# Patient Record
Sex: FEMALE | Race: WHITE | HISPANIC OR LATINO | Employment: FULL TIME | URBAN - METROPOLITAN AREA
[De-identification: names, ages, dates, MRNs, and addresses within clinical notes are randomized per-mention and may not be internally consistent; named-entity substitution may affect disease eponyms.]

---

## 2017-05-16 ENCOUNTER — HOSPITAL ENCOUNTER (OUTPATIENT)
Facility: MEDICAL CENTER | Age: 32
End: 2017-05-16
Attending: SPECIALIST | Admitting: SPECIALIST
Payer: MEDICAID

## 2017-05-16 VITALS
SYSTOLIC BLOOD PRESSURE: 124 MMHG | TEMPERATURE: 97.9 F | HEART RATE: 93 BPM | DIASTOLIC BLOOD PRESSURE: 69 MMHG | RESPIRATION RATE: 18 BRPM

## 2017-05-16 LAB — CRYSTALS AMN MICRO: NORMAL

## 2017-05-16 PROCEDURE — 89060 EXAM SYNOVIAL FLUID CRYSTALS: CPT

## 2017-05-16 PROCEDURE — 59025 FETAL NON-STRESS TEST: CPT | Performed by: SPECIALIST

## 2017-05-17 NOTE — PROGRESS NOTES
EDC -12-17 36.1 weeks pt is here with C/O leakage of fluid and contractions. External monitors on speculum exam done no pooling of fluid noted, fern test done and sent to lab. SVE done 4 cm thick and high.   fern results is negative.   Dr Neil was called report given, SVE repeated no change. Order received to discharge pt home.    instructions was given  and pt was discharged with FOB.

## 2017-06-04 NOTE — H&P
DATE OF SCHEDULED ADMISSION:  2017    REASON FOR ADMISSION:  Prodromal labor with a favorable Mckeon score and   cervix.    HISTORY OF PRESENT ILLNESS:  This is a 31-year-old  4, para 3 at   39-1/7th weeks gestation based on excellent dates with a 7-week ultrasound who   has been having prodromal labor symptoms, has been 3-4 cm for nearly 4 weeks   and is having increasing pelvic discomfort and pressure.  Given her favorable   Mckeon score and excellent dating, she is now interested in proceeding forward   with augmentation of labor with Pitocin per protocol.  Risks, benefits and   alternatives have been addressed.  She has asked appropriate questions and   wished to proceed forward with augmentation as planned.    PAST MEDICAL HISTORY:  Migraine headaches.    PAST SURGICAL HISTORY:  Herniorrhaphy in  and a tummy tuck in .    OBSTETRICAL HISTORY:  In ,  and , patient had term vaginal   deliveries.  This is her fourth pregnancy.    SOCIAL HISTORY:  She denies the use of any alcohol, tobacco, or recreational   drug use.    MEDICATIONS:  Prenatal vitamins.    ALLERGIES:  No known drug allergies.    PHYSICAL EXAMINATION:  VITAL SIGNS:  She is afebrile, hemodynamically stable.  HEART:  Regular rate and rhythm.  CHEST:  Clear to auscultation bilaterally.  ABDOMEN:  Soft, gravid, and nontender.  PELVIC:  Sterile vaginal exam, 3-4, 80%, -2 station.  EXTREMITIES:  Nontender.    LABORATORY DATA:  Prenatal care labs are all in order.  She is group B strep   negative.    ASSESSMENT AND PLAN:  A 31-year-old  4, para 3, at term with overall   reassuring fetal status, who now elects to proceed forward with augmentation   of labor with Pitocin per protocol.  The risks, benefits and alternatives have   been addressed.  She asked appropriate questions and wished to proceed   forward with Pitocin augmentation.       ____________________________________     KAYLYN TILLMAN MD    [facility] /  [facility]    DD:  06/02/2017 13:05:36  DT:  06/02/2017 16:22:02    D#:  [id number]  Job#:  [id number]

## 2017-06-06 ENCOUNTER — HOSPITAL ENCOUNTER (INPATIENT)
Facility: MEDICAL CENTER | Age: 32
LOS: 1 days | End: 2017-06-07
Attending: SPECIALIST | Admitting: SPECIALIST
Payer: MEDICAID

## 2017-06-06 LAB
BASOPHILS # BLD AUTO: 0.3 % (ref 0–1.8)
BASOPHILS # BLD: 0.02 K/UL (ref 0–0.12)
EOSINOPHIL # BLD AUTO: 0.13 K/UL (ref 0–0.51)
EOSINOPHIL NFR BLD: 1.7 % (ref 0–6.9)
ERYTHROCYTE [DISTWIDTH] IN BLOOD BY AUTOMATED COUNT: 47.8 FL (ref 35.9–50)
ERYTHROCYTE [DISTWIDTH] IN BLOOD BY AUTOMATED COUNT: 48.5 FL (ref 35.9–50)
HCT VFR BLD AUTO: 29 % (ref 37–47)
HCT VFR BLD AUTO: 30.8 % (ref 37–47)
HGB BLD-MCNC: 9.2 G/DL (ref 12–16)
HGB BLD-MCNC: 9.6 G/DL (ref 12–16)
HOLDING TUBE BB 8507: NORMAL
IMM GRANULOCYTES # BLD AUTO: 0.08 K/UL (ref 0–0.11)
IMM GRANULOCYTES NFR BLD AUTO: 1.1 % (ref 0–0.9)
LYMPHOCYTES # BLD AUTO: 2.3 K/UL (ref 1–4.8)
LYMPHOCYTES NFR BLD: 30.5 % (ref 22–41)
MCH RBC QN AUTO: 26.7 PG (ref 27–33)
MCH RBC QN AUTO: 27.1 PG (ref 27–33)
MCHC RBC AUTO-ENTMCNC: 31.2 G/DL (ref 33.6–35)
MCHC RBC AUTO-ENTMCNC: 31.7 G/DL (ref 33.6–35)
MCV RBC AUTO: 85.3 FL (ref 81.4–97.8)
MCV RBC AUTO: 85.6 FL (ref 81.4–97.8)
MONOCYTES # BLD AUTO: 0.54 K/UL (ref 0–0.85)
MONOCYTES NFR BLD AUTO: 7.2 % (ref 0–13.4)
NEUTROPHILS # BLD AUTO: 4.48 K/UL (ref 2–7.15)
NEUTROPHILS NFR BLD: 59.2 % (ref 44–72)
NRBC # BLD AUTO: 0 K/UL
NRBC BLD AUTO-RTO: 0 /100 WBC
PLATELET # BLD AUTO: 147 K/UL (ref 164–446)
PLATELET # BLD AUTO: 169 K/UL (ref 164–446)
PMV BLD AUTO: 12.6 FL (ref 9–12.9)
PMV BLD AUTO: 12.6 FL (ref 9–12.9)
RBC # BLD AUTO: 3.4 M/UL (ref 4.2–5.4)
RBC # BLD AUTO: 3.6 M/UL (ref 4.2–5.4)
WBC # BLD AUTO: 13.5 K/UL (ref 4.8–10.8)
WBC # BLD AUTO: 7.6 K/UL (ref 4.8–10.8)

## 2017-06-06 PROCEDURE — 303615 HCHG EPIDURAL/SPINAL ANESTHESIA FOR LABOR

## 2017-06-06 PROCEDURE — 700102 HCHG RX REV CODE 250 W/ 637 OVERRIDE(OP): Performed by: SPECIALIST

## 2017-06-06 PROCEDURE — 4A1HX4Z MONITORING OF PRODUCTS OF CONCEPTION, CARDIAC ELECTRICAL ACTIVITY, EXTERNAL APPROACH: ICD-10-PCS | Performed by: SPECIALIST

## 2017-06-06 PROCEDURE — 10907ZC DRAINAGE OF AMNIOTIC FLUID, THERAPEUTIC FROM PRODUCTS OF CONCEPTION, VIA NATURAL OR ARTIFICIAL OPENING: ICD-10-PCS | Performed by: SPECIALIST

## 2017-06-06 PROCEDURE — 700101 HCHG RX REV CODE 250

## 2017-06-06 PROCEDURE — 304965 HCHG RECOVERY SERVICES

## 2017-06-06 PROCEDURE — 0UQMXZZ REPAIR VULVA, EXTERNAL APPROACH: ICD-10-PCS | Performed by: SPECIALIST

## 2017-06-06 PROCEDURE — 85027 COMPLETE CBC AUTOMATED: CPT

## 2017-06-06 PROCEDURE — 36415 COLL VENOUS BLD VENIPUNCTURE: CPT

## 2017-06-06 PROCEDURE — A9270 NON-COVERED ITEM OR SERVICE: HCPCS | Performed by: SPECIALIST

## 2017-06-06 PROCEDURE — 770002 HCHG ROOM/CARE - OB PRIVATE (112)

## 2017-06-06 PROCEDURE — 700111 HCHG RX REV CODE 636 W/ 250 OVERRIDE (IP)

## 2017-06-06 PROCEDURE — 700105 HCHG RX REV CODE 258

## 2017-06-06 PROCEDURE — 700111 HCHG RX REV CODE 636 W/ 250 OVERRIDE (IP): Performed by: SPECIALIST

## 2017-06-06 PROCEDURE — 85025 COMPLETE CBC W/AUTO DIFF WBC: CPT

## 2017-06-06 PROCEDURE — 59409 OBSTETRICAL CARE: CPT

## 2017-06-06 PROCEDURE — 700105 HCHG RX REV CODE 258: Performed by: SPECIALIST

## 2017-06-06 RX ORDER — VITAMIN A ACETATE, BETA CAROTENE, ASCORBIC ACID, CHOLECALCIFEROL, .ALPHA.-TOCOPHEROL ACETATE, DL-, THIAMINE MONONITRATE, RIBOFLAVIN, NIACINAMIDE, PYRIDOXINE HYDROCHLORIDE, FOLIC ACID, CYANOCOBALAMIN, CALCIUM CARBONATE, FERROUS FUMARATE, ZINC OXIDE, CUPRIC OXIDE 3080; 12; 120; 400; 1; 1.84; 3; 20; 22; 920; 25; 200; 27; 10; 2 [IU]/1; UG/1; MG/1; [IU]/1; MG/1; MG/1; MG/1; MG/1; MG/1; [IU]/1; MG/1; MG/1; MG/1; MG/1; MG/1
1 TABLET, FILM COATED ORAL EVERY MORNING
Status: DISCONTINUED | OUTPATIENT
Start: 2017-06-06 | End: 2017-06-07 | Stop reason: HOSPADM

## 2017-06-06 RX ORDER — TERBUTALINE SULFATE 1 MG/ML
0.25 INJECTION, SOLUTION SUBCUTANEOUS PRN
Status: DISCONTINUED | OUTPATIENT
Start: 2017-06-06 | End: 2017-06-06 | Stop reason: HOSPADM

## 2017-06-06 RX ORDER — DOCUSATE SODIUM 100 MG/1
100 CAPSULE, LIQUID FILLED ORAL 2 TIMES DAILY PRN
Status: DISCONTINUED | OUTPATIENT
Start: 2017-06-06 | End: 2017-06-07 | Stop reason: HOSPADM

## 2017-06-06 RX ORDER — ONDANSETRON 2 MG/ML
4 INJECTION INTRAMUSCULAR; INTRAVENOUS EVERY 6 HOURS PRN
Status: DISCONTINUED | OUTPATIENT
Start: 2017-06-06 | End: 2017-06-07 | Stop reason: HOSPADM

## 2017-06-06 RX ORDER — BISACODYL 10 MG
10 SUPPOSITORY, RECTAL RECTAL PRN
Status: DISCONTINUED | OUTPATIENT
Start: 2017-06-06 | End: 2017-06-07 | Stop reason: HOSPADM

## 2017-06-06 RX ORDER — OXYCODONE HYDROCHLORIDE AND ACETAMINOPHEN 5; 325 MG/1; MG/1
1 TABLET ORAL EVERY 4 HOURS PRN
Status: DISCONTINUED | OUTPATIENT
Start: 2017-06-06 | End: 2017-06-07 | Stop reason: HOSPADM

## 2017-06-06 RX ORDER — BUPIVACAINE HYDROCHLORIDE 2.5 MG/ML
INJECTION, SOLUTION EPIDURAL; INFILTRATION; INTRACAUDAL
Status: DISCONTINUED
Start: 2017-06-06 | End: 2017-06-06

## 2017-06-06 RX ORDER — SODIUM CHLORIDE, SODIUM LACTATE, POTASSIUM CHLORIDE, CALCIUM CHLORIDE 600; 310; 30; 20 MG/100ML; MG/100ML; MG/100ML; MG/100ML
INJECTION, SOLUTION INTRAVENOUS
Status: COMPLETED
Start: 2017-06-06 | End: 2017-06-06

## 2017-06-06 RX ORDER — IBUPROFEN 600 MG/1
600 TABLET ORAL EVERY 6 HOURS PRN
Status: DISCONTINUED | OUTPATIENT
Start: 2017-06-06 | End: 2017-06-07 | Stop reason: HOSPADM

## 2017-06-06 RX ORDER — ROPIVACAINE HYDROCHLORIDE 2 MG/ML
INJECTION, SOLUTION EPIDURAL; INFILTRATION; PERINEURAL
Status: COMPLETED
Start: 2017-06-06 | End: 2017-06-06

## 2017-06-06 RX ORDER — OXYCODONE HYDROCHLORIDE AND ACETAMINOPHEN 5; 325 MG/1; MG/1
2 TABLET ORAL EVERY 4 HOURS PRN
Status: DISCONTINUED | OUTPATIENT
Start: 2017-06-06 | End: 2017-06-07 | Stop reason: HOSPADM

## 2017-06-06 RX ORDER — SODIUM CHLORIDE, SODIUM LACTATE, POTASSIUM CHLORIDE, CALCIUM CHLORIDE 600; 310; 30; 20 MG/100ML; MG/100ML; MG/100ML; MG/100ML
INJECTION, SOLUTION INTRAVENOUS CONTINUOUS
Status: DISCONTINUED | OUTPATIENT
Start: 2017-06-06 | End: 2017-06-06 | Stop reason: HOSPADM

## 2017-06-06 RX ORDER — SODIUM CHLORIDE, SODIUM LACTATE, POTASSIUM CHLORIDE, CALCIUM CHLORIDE 600; 310; 30; 20 MG/100ML; MG/100ML; MG/100ML; MG/100ML
INJECTION, SOLUTION INTRAVENOUS PRN
Status: DISCONTINUED | OUTPATIENT
Start: 2017-06-06 | End: 2017-06-07 | Stop reason: HOSPADM

## 2017-06-06 RX ORDER — HYDROXYZINE 50 MG/1
50 TABLET, FILM COATED ORAL EVERY 6 HOURS PRN
Status: DISCONTINUED | OUTPATIENT
Start: 2017-06-06 | End: 2017-06-06 | Stop reason: HOSPADM

## 2017-06-06 RX ORDER — ONDANSETRON 4 MG/1
4 TABLET, ORALLY DISINTEGRATING ORAL EVERY 6 HOURS PRN
Status: DISCONTINUED | OUTPATIENT
Start: 2017-06-06 | End: 2017-06-07 | Stop reason: HOSPADM

## 2017-06-06 RX ORDER — MISOPROSTOL 200 UG/1
800 TABLET ORAL
Status: COMPLETED | OUTPATIENT
Start: 2017-06-06 | End: 2017-06-06

## 2017-06-06 RX ORDER — MISOPROSTOL 200 UG/1
600 TABLET ORAL
Status: DISCONTINUED | OUTPATIENT
Start: 2017-06-06 | End: 2017-06-07 | Stop reason: HOSPADM

## 2017-06-06 RX ADMIN — IBUPROFEN 600 MG: 600 TABLET, FILM COATED ORAL at 13:25

## 2017-06-06 RX ADMIN — ROPIVACAINE HYDROCHLORIDE 200 MG: 2 INJECTION, SOLUTION EPIDURAL; INFILTRATION at 07:34

## 2017-06-06 RX ADMIN — SODIUM CHLORIDE, POTASSIUM CHLORIDE, SODIUM LACTATE AND CALCIUM CHLORIDE: 600; 310; 30; 20 INJECTION, SOLUTION INTRAVENOUS at 05:48

## 2017-06-06 RX ADMIN — MISOPROSTOL 800 MCG: 200 TABLET ORAL at 10:10

## 2017-06-06 RX ADMIN — OXYCODONE HYDROCHLORIDE AND ACETAMINOPHEN 1 TABLET: 5; 325 TABLET ORAL at 17:01

## 2017-06-06 RX ADMIN — OXYTOCIN 1 MILLI-UNITS/MIN: 10 INJECTION, SOLUTION INTRAMUSCULAR; INTRAVENOUS at 05:45

## 2017-06-06 RX ADMIN — Medication 125 ML/HR: at 11:04

## 2017-06-06 RX ADMIN — IBUPROFEN 600 MG: 600 TABLET, FILM COATED ORAL at 20:14

## 2017-06-06 RX ADMIN — SODIUM CHLORIDE, POTASSIUM CHLORIDE, SODIUM LACTATE AND CALCIUM CHLORIDE 1000 ML: 600; 310; 30; 20 INJECTION, SOLUTION INTRAVENOUS at 07:30

## 2017-06-06 ASSESSMENT — PATIENT HEALTH QUESTIONNAIRE - PHQ9
SUM OF ALL RESPONSES TO PHQ QUESTIONS 1-9: 0
1. LITTLE INTEREST OR PLEASURE IN DOING THINGS: NOT AT ALL
SUM OF ALL RESPONSES TO PHQ9 QUESTIONS 1 AND 2: 0
2. FEELING DOWN, DEPRESSED, IRRITABLE, OR HOPELESS: NOT AT ALL

## 2017-06-06 ASSESSMENT — PAIN SCALES - GENERAL
PAINLEVEL_OUTOF10: 1
PAINLEVEL_OUTOF10: 5
PAINLEVEL_OUTOF10: 1
PAINLEVEL_OUTOF10: 3
PAINLEVEL_OUTOF10: 1

## 2017-06-06 ASSESSMENT — COPD QUESTIONNAIRES
COPD SCREENING SCORE: 0
HAVE YOU SMOKED AT LEAST 100 CIGARETTES IN YOUR ENTIRE LIFE: NO/DON'T KNOW
DO YOU EVER COUGH UP ANY MUCUS OR PHLEGM?: NO/ONLY WITH OCCASIONAL COLDS OR INFECTIONS
DURING THE PAST 4 WEEKS HOW MUCH DID YOU FEEL SHORT OF BREATH: NONE/LITTLE OF THE TIME

## 2017-06-06 ASSESSMENT — LIFESTYLE VARIABLES
DO YOU DRINK ALCOHOL: NO
ALCOHOL_USE: NO
EVER_SMOKED: NEVER

## 2017-06-06 NOTE — IP AVS SNAPSHOT
6/7/2017    [address]  [facility] [address]    Dear Ainsley:    [facility] wants to ensure your discharge home is safe and you or your loved ones have had all of your questions answered regarding your care after you leave the hospital.    Below is a list of resources and contact information should you have any questions regarding your hospital stay, follow-up instructions, or active medical symptoms.    Questions or Concerns Regarding… Contact   Medical Questions Related to Your Discharge  (7 days a week, 8am-5pm) Contact a Nurse Care Coordinator   [phone]   Medical Questions Not Related to Your Discharge  (24 hours a day / 7 days a week)  Contact the Nurse Health Line   [phone]    Medications or Discharge Instructions Refer to your discharge packet   or contact your [facility] Primary Care Provider   [phone]   Follow-up Appointment(s) Schedule your appointment via ImageProtect   or contact Scheduling [phone]   Billing Review your statement via ImageProtect  or contact Billing [phone]   Medical Records Review your records via ImageProtect   or contact Medical Records [phone]     You may receive a telephone call within two days of discharge. This call is to make certain you understand your discharge instructions and have the opportunity to have any questions answered. You can also easily access your medical information, test results and upcoming appointments via the ImageProtect free online health management tool. You can learn more and sign up at [email or web address]. For assistance setting up your ImageProtect account, please call [phone].    Once again, we want to ensure your discharge home is safe and that you have a clear understanding of any next steps in your care. If you have any questions or concerns, please do not hesitate to contact us, we are here for you. Thank you for choosing [facility] for your healthcare needs.    Sincerely,    Your [facility] Healthcare Team

## 2017-06-06 NOTE — IP AVS SNAPSHOT
Home Care Instructions                                                                                                                Ainsley Baker   MRN: [id number]    Department:  POST PARTUM [facility]   2017           Follow-up Information     1. Follow up with Julián Neil M.D.. Schedule an appointment as soon as possible for a visit in 6 weeks.    Specialty:  OB/Gyn    Why:  for follow up    Contact information    [address]  [place] [address]  [phone]         I assume responsibility for securing a follow-up [place] Screening blood test on my baby within the specified date range.    -                  Discharge Instructions       POSTPARTUM DISCHARGE INSTRUCTIONS FOR MOM    YOB: 1985   Age: 31 y.o.               Admit Date: 2017     Discharge Date: 2017  Attending Doctor:  Julián Neil M.D.                  Allergies:  Review of patient's allergies indicates no known allergies.    Discharged to home by car. Discharged via wheelchair, hospital escort: Yes.  Special equipment needed: Not Applicable  Belongings with: Personal  Be sure to schedule a follow-up appointment with your primary care doctor or any specialists as instructed.     Discharge Plan:   Diet Plan: Discussed  Activity Level: Discussed  Confirmed Follow up Appointment: Patient to Call and Schedule Appointment  Confirmed Symptoms Management: Discussed  Medication Reconciliation Updated: Yes  Influenza Vaccine Indication: Indicated: Not available from distributor/    REASONS TO CALL YOUR OBSTETRICIAN:  1.   Persistent fever or shaking chills (Temperature higher than 100.4)  2.   Heavy bleeding (soaking more than 1 pad per hour); Passing clots  3.   Foul odor from vagina  4.   Mastitis (Breast infection; breast pain, chills, fever, redness)  5.   Urinary pain, burning or frequency  6.   Episiotomy infection  7.   Abdominal incision infection  8.   Severe depression longer than 24 hours    HAND  "WASHING  · Prior to handling the baby.  · Before breastfeeding or bottle feeding baby.  · After using the bathroom or changing the baby's diaper.    WOUND CARE  Ask your physician for additional care instructions.  In general:    ·  Incision:      · Keep clean and dry.    · Do NOT lift anything heavier than your baby for up to 6 weeks.    · There should not be any opening or pus.      VAGINAL CARE  · Nothing inside vagina for 6 weeks: no sexual intercourse, tampons or douching.  · Bleeding may continue for 2-4 weeks.  Amount may vary.    · Call your physician for heavy bleeding which means soaking more than 1 pad per hour    BIRTH CONTROL  · It is possible to become pregnant at any time after delivery and while breastfeeding.  · Plan to discuss a method of birth control with your physician at your follow up visit. visit.    DIET AND ELIMINATION  · Eating more fiber (bran cereal, fruits, and vegetables) and drinking plenty of fluids will help to avoid constipation.  · Urinary frequency after childbirth is normal.    POSTPARTUM BLUES  During the first few days after birth, you may experience a sense of the \"blues\" which may include impatience, irritability or even crying.  These feeling come and go quickly.  However, as many as 1 in 10 women experience emotional symptoms known as postpartum depression.    Postpartum depression:  May start as early as the second or third day after delivery or take several weeks or months to develop.  Symptoms of \"blues\" are present, but are more intense:  Crying spells; loss of appetite; feelings of hopelessness or loss of control; fear of touching the baby; over concern or no concern at all about the baby; little or no concern about your own appearance/caring for yourself; and/or inability to sleep or excessive sleeping.  Contact your physician if you are experiencing any of these symptoms.    Crisis Hotline:  · National Crisis Hotline:  9-380-XJJFQTZ  Or " "1-177.330.2378  · [place] Crisis Hotline:  1-213.196.2177  Or 718-998-5891    PREVENTING SHAKEN BABY:  If you are angry or stressed, PUT THE BABY IN THE CRIB, step away, take some deep breaths, and wait until you are calm to care for the baby.  DO NOT SHAKE THE BABY.  You are not alone, call a supporter for help.    · Crisis Call Center 24/7 crisis line 660-080-0029 or 1-308.822.9447  · You can also text them, text \"ANSWER\" to 671475    QUIT SMOKING/TOBACCO USE:  I understand the use of any tobacco products increases my chance of suffering from future heart disease and could cause other illnesses which may shorten my life. Quitting the use of tobacco products is the single most important thing I can do to improve my health. For further information on smoking / tobacco cessation call a Toll Free Quit Line at 1-418.151.1609 (*National [employer] [place]) or 1-536.930.5908 (American Lung Association) or you can access the web based program at www.lungusa.org.    · [place] Tobacco Users Help Line:  (376) 585-1691       Toll Free: 1-509.666.8622  · Quit Tobacco Program [facility] (432)638-2453    DEPRESSION / SUICIDE RISK:  As you are discharged from this [facility] facility, it is important to learn how to keep safe from harming yourself.    Recognize the warning signs:  · Abrupt changes in personality, positive or negative- including increase in energy   · Giving away possessions  · Change in eating patterns- significant weight changes-  positive or negative  · Change in sleeping patterns- unable to sleep or sleeping all the time   · Unwillingness or inability to communicate  · Depression  · Unusual sadness, discouragement and loneliness  · Talk of wanting to die  · Neglect of personal appearance   · Rebelliousness- reckless behavior  · Withdrawal from people/activities they love  · Confusion- inability to concentrate     If you or a loved one observes any of these behaviors or has concerns about " self-harm, here's what you can do:  · Talk about it- your feelings and reasons for harming yourself  · Remove any means that you might use to hurt yourself (examples: pills, rope, extension cords, firearm)  · Get professional help from the community (Mental Health, Substance Abuse, psychological counseling)  · Do not be alone:Call your Safe Contact- someone whom you trust who will be there for you.  · Call your local CRISIS HOTLINE 420-0154 or 584-318-1075  · Call your local Children's Mobile Crisis Response Team [place] (725) 200-8543 or www.[employer]  · Call the toll free National Suicide Prevention Hotlines   · National Suicide Prevention Lifeline 490-135-PKQF (6748)  · National Hope Line Network 800-SUICIDE (229-8870)    DISCHARGE SURVEY:  Thank you for choosing [facility].  We hope we provided you with very good care.  You may be receiving a survey in the mail.  Please fill it out.  Your opinion is valuable to us.    ADDITIONAL EDUCATIONAL MATERIALS GIVEN TO PATIENT:  Pelvic rest for 6 weeks.  Follow up with me in the office in 6 weeks for a post partum visit.      My signature on this form indicates that:  1.  I have reviewed and understand the above information  2.  My questions regarding this information have been answered to my satisfaction.  3.  I have formulated a plan with my discharge nurse to obtain my prescribed medication for home.         Discharge Medication Instructions:    Below are the medications your physician expects you to take upon discharge:    Review all your home medications and newly ordered medications with your doctor and/or pharmacist. Follow medication instructions as directed by your doctor and/or pharmacist.    Please keep your medication list with you and share with your physician.               Medication List      START taking these medications        Instructions    Morning Afternoon Evening Bedtime     MG Caps        Take 100 mg by mouth 2 times a day  as needed for Constipation.   Dose:  100 mg                        ferrous sulfate 325 (65 FE) MG tablet        Take 1 Tab by mouth every day.   Dose:  325 mg                        ibuprofen 600 MG Tabs   Last time this was given:  600 mg on 6/7/2017  8:33 AM   Commonly known as:  MOTRIN        Take 1 Tab by mouth every 6 hours as needed (For cramping after delivery; do not give if patient is receiving ketorolac (Toradol)).   Dose:  600 mg                        oxycodone-acetaminophen 5-325 MG Tabs   Last time this was given:  1 Tab on 6/7/2017  6:30 AM   Commonly known as:  PERCOCET        Take 1-2 Tabs by mouth every four hours as needed (for Moderate Pain (Pain Scale 4-6) after delivery).   Dose:  1-2 Tab                          CONTINUE taking these medications        Instructions    Morning Afternoon Evening Bedtime    PNV PO        Take  by mouth.                             Where to Get Your Medications      You can get these medications from any pharmacy     Bring a paper prescription for each of these medications    - ferrous sulfate 325 (65 FE) MG tablet      Information about where to get these medications is not yet available     ! Ask your nurse or doctor about these medications    -  MG Caps  - ibuprofen 600 MG Tabs  - oxycodone-acetaminophen 5-325 MG Tabs            Crisis Hotline:     [place] Crisis Hotline:  6-940-AIUDVDV or 1-936.887.2098    [place] Crisis Hotline:    1-841.876.7098 or 646-060-1980        Disclaimer           _____________________________________                     __________       ________       Patient/Mother Signature or Legal                          Date                   Time

## 2017-06-06 NOTE — CARE PLAN
Problem: Altered physiologic condition related to immediate post-delivery state and potential for bleeding/hemorrhage  Goal: Patient physiologically stable as evidenced by normal lochia, palpable uterine involution and vital signs within normal limits  Outcome: PROGRESSING AS EXPECTED   Patient in stable condition, vitals WDL, lochia light, and fundus firm.     Problem: Potential for postpartum infection related to presence of episiotomy/vaginal tear and/or uterine contamination  Goal: Patient will be absent from signs and symptoms of infection  Outcome: PROGRESSING AS EXPECTED   Patient is free from any s/s of infection at this time. All vitals are WDL. Patient does not appear to be in any kind of distress at this time. Will continue to monitor.

## 2017-06-06 NOTE — OR SURGEON
Immediate Post-Operative Note          Estimated Blood Loss: 300ccs    Findings:  over bilateral labial abrasions without any nuchal cord with easy delivery of the shoulders and body with the placenta spontaneous and intact with 3vc with Apgars of 8/9 at one and five min respectively    Complications: none        2017 11:11 AM Julián Neil

## 2017-06-06 NOTE — H&P
ADDENDUM    Briefly, this is a 31-year-old  4, para 3 at 39-1/7 weeks' gestation   with prodromal labor symptoms who has been having increasing pelvic discomfort   over the last month, had been seen on the day prior to presentation and it   changed from 3-4 cm and on the day of presentation for her augmentation of   labor for prodromal labor symptoms and favorable cervix, she was found to have   changed her cervix to 5-6 cm with a bulging bag of water, vertex presentation   was confirmed, overall reassuring fetal status was noted and the patient was   admitted with the plan to proceed forward with admission given her active   labor.       ____________________________________     MD CRISPIN GRIJALVA / [facility]    DD:  2017 07:00:04  DT:  2017 07:08:27    D#:  [id number]  Job#:  [id number]

## 2017-06-06 NOTE — DELIVERY NOTE
DATE OF SERVICE:  2017    DELIVERY NOTE:  Briefly, this is a 31-year-old  4, para 3 at 39 and   1/7th weeks gestation who had been scheduled for augmentation of labor given   prodromal labor who presented in active labor at 5-6 cm dilated.  Overall,   reassuring fetal status was noted.  The patient progressed well in labor, had   an artificial rupture of membranes and clear amniotic fluid noted, had in a   continuous lumbar epidural to optimize pain management, subsequently underwent   a spontaneous vaginal delivery of her bilateral labial lacerations with easy   delivery of the shoulders and body.  Cord was clamped and cut.  Infant was   handed to waiting nursing staff.  Apgars were 8 and 9 at 1 and 5 minutes   respectively.  Placenta was spontaneous and intact with 3-vessel cord.  The   labial abrasions were reapproximated with single interrupted sutures using 4-0   Vicryl.  Patient tolerated labor and delivery and repair well.       ____________________________________     MD CRISPIN GRIJALVA / [place]    DD:  2017 11:13:29  DT:  2017 14:31:29    D#:  [id number]  Job#:  [id number]

## 2017-06-06 NOTE — IP AVS SNAPSHOT
[employer] Access Code: [email or web address]  Expires: 7/7/2017 10:17 AM    [employer]  A secure, online tool to manage your health information     [employer]’s InteliWISE USA® is a secure, online tool that connects you to your personalized health information from the privacy of your home -- day or night - making it very easy for you to manage your healthcare. Once the activation process is completed, you can even access your medical information using the [employer] USA wen, which is available for free in the Apple Wen store or Google Play store.     [employer] USA provides the following levels of access (as shown below):   My Chart Features   [facility] Primary Care Doctor [facility]  Specialists [facility]  Urgent  Care Non-[facility]  Primary Care  Doctor   Email your healthcare team securely and privately 24/7 X X X X   Manage appointments: schedule your next appointment; view details of past/upcoming appointments X      Request prescription refills. X      View recent personal medical records, including lab and immunizations X X X X   View health record, including health history, allergies, medications X X X X   Read reports about your outpatient visits, procedures, consult and ER notes X X X X   See your discharge summary, which is a recap of your hospital and/or ER visit that includes your diagnosis, lab results, and care plan. X X       How to register for [employer]WISE USA:  1. Go to  [email or web address].  2. Click on the Sign Up Now box, which takes you to the New Member Sign Up page. You will need to provide the following information:  a. Enter your [employer] USA Access Code exactly as it appears at the top of this page. (You will not need to use this code after you’ve completed the sign-up process. If you do not sign up before the expiration date, you must request a new code.)   b. Enter your date of birth.   c. Enter your home email address.   d. Click Submit, and follow the next screen’s instructions.  3. Create a [employer] USA ID. This will be your [employer]  login ID and cannot be changed, so think of one that is secure and easy to remember.  4. Create a [employer] password. You can change your password at any time.  5. Enter your Password Reset Question and Answer. This can be used at a later time if you forget your password.   6. Enter your e-mail address. This allows you to receive e-mail notifications when new information is available in [employer].  7. Click Sign Up. You can now view your health information.    For assistance activating your [employer] account, call [phone]

## 2017-06-06 NOTE — PROGRESS NOTES
Patient received from labor and delivery to S353  . Bedside report received from RN L&D , assumed care of patient.  Patient oriented to room and surroundings, call system, skylight education channel, visiting policy, infant security including ID bands, not letting anyone take infant without photo ID, 0-10 pain scale and pain management discussed. Patient denies any pain and any needs at this time.  Patient instructed to call for assistance as needed. Assessment done.

## 2017-06-06 NOTE — PROGRESS NOTES
0510 - 30 y/o  EDC , EGA 39.1, here to L&D room 221 with  Al. Pt here for scheduled elective IOL. EFM/TOCO applied, Patient states positive FM. Denies vaginal LOF or Bleeding. VSS.   0525 - IV started, labs collected, admit profile completed.   0545 - SVE - 5-6/50/-1. Pitocin started per orders. Discussed poc with pt. Pt desires epidural once in active labor.   0626 - Dr Neil at bedside. SVE - 5-6cm. AROM - small amount of bloody fluid noted.  0710 - Pt requesting epidural, consents signed, LR bolus started.   0726 - Dr Hewitt at bedside for epidural placement. VSS.   0731 - Test dose administered without complications.   0745 - Staples placed. SVE - 7/80/-1, moderate amount of bloody fluid noted.   0750 - Pt wedged to right side, denying any pain with UC's.   0900 - Dr Neil at bedside. SVE - 8cm. Pt repositioned with peanut ball in place.   0955 - Repetitive variable decels noted. SVE - complete. Dr Neil notified and in route to L&D.   1006 - Dr Neil to bedside for delivery of viable baby boy, apgar 8/9. Bilateral labial tears noted and repaired by MD.   1215 - Pt up to bathroom and voided. Pt transferred to  via .   1235 - Report given to Tory TAN. All questions answered.

## 2017-06-07 VITALS
OXYGEN SATURATION: 96 % | RESPIRATION RATE: 18 BRPM | BODY MASS INDEX: 32.02 KG/M2 | DIASTOLIC BLOOD PRESSURE: 66 MMHG | SYSTOLIC BLOOD PRESSURE: 102 MMHG | HEART RATE: 71 BPM | WEIGHT: 174 LBS | HEIGHT: 62 IN | TEMPERATURE: 97.4 F

## 2017-06-07 PROCEDURE — 700102 HCHG RX REV CODE 250 W/ 637 OVERRIDE(OP): Performed by: SPECIALIST

## 2017-06-07 PROCEDURE — A9270 NON-COVERED ITEM OR SERVICE: HCPCS | Performed by: SPECIALIST

## 2017-06-07 RX ORDER — FERROUS SULFATE 325(65) MG
325 TABLET ORAL DAILY
Qty: 30 TAB | Refills: 0 | Status: SHIPPED | OUTPATIENT
Start: 2017-06-07 | End: 2019-09-06

## 2017-06-07 RX ORDER — IBUPROFEN 600 MG/1
600 TABLET ORAL EVERY 6 HOURS PRN
Qty: 30 TAB | Refills: 0 | Status: SHIPPED | OUTPATIENT
Start: 2017-06-07 | End: 2023-08-18

## 2017-06-07 RX ORDER — PSEUDOEPHEDRINE HCL 30 MG
100 TABLET ORAL 2 TIMES DAILY PRN
Qty: 60 CAP | Refills: 0 | Status: SHIPPED | OUTPATIENT
Start: 2017-06-07 | End: 2019-09-06

## 2017-06-07 RX ORDER — OXYCODONE HYDROCHLORIDE AND ACETAMINOPHEN 5; 325 MG/1; MG/1
1-2 TABLET ORAL EVERY 4 HOURS PRN
Qty: 30 TAB | Refills: 0 | Status: SHIPPED | OUTPATIENT
Start: 2017-06-07 | End: 2019-09-06

## 2017-06-07 RX ADMIN — OXYCODONE HYDROCHLORIDE AND ACETAMINOPHEN 1 TABLET: 5; 325 TABLET ORAL at 02:24

## 2017-06-07 RX ADMIN — OXYCODONE HYDROCHLORIDE AND ACETAMINOPHEN 1 TABLET: 5; 325 TABLET ORAL at 06:30

## 2017-06-07 RX ADMIN — IBUPROFEN 600 MG: 600 TABLET, FILM COATED ORAL at 08:33

## 2017-06-07 RX ADMIN — Medication 1 TABLET: at 08:32

## 2017-06-07 RX ADMIN — IBUPROFEN 600 MG: 600 TABLET, FILM COATED ORAL at 02:24

## 2017-06-07 ASSESSMENT — PAIN SCALES - GENERAL
PAINLEVEL_OUTOF10: 3
PAINLEVEL_OUTOF10: 3
PAINLEVEL_OUTOF10: 4
PAINLEVEL_OUTOF10: 3
PAINLEVEL_OUTOF10: 1

## 2017-06-07 NOTE — PROGRESS NOTES
POST PARTUM DAY # 1  The patient complains of cramping pain.   She says that she feels fine other wise and she says that she has no other problems or complaints.   She says that she would like to go home today.   The patient's vital signs are stable and she is afebrile.   She appears well developed and well nourished and relaxed and alert and comfortable and in no apparent distress.   Labs: the patient's hemoglobin went from 9.6 grams per deciliter antepartum to 9.2 grams per deciliter post partum.   We will discharge home today with prescriptions for percocet and ibuprofen and iron sulfate and stool softener.  I asked her to follow up with Dr. Neil in about 6 weeks for a post partum visit.    Cesar Valentino M.D.

## 2017-06-07 NOTE — DISCHARGE INSTRUCTIONS
POSTPARTUM DISCHARGE INSTRUCTIONS FOR MOM    YOB: 1985   Age: 31 y.o.               Admit Date: 2017     Discharge Date: 2017  Attending Doctor:  Julián Neil M.D.                  Allergies:  Review of patient's allergies indicates no known allergies.    Discharged to home by car. Discharged via wheelchair, hospital escort: Yes.  Special equipment needed: Not Applicable  Belongings with: Personal  Be sure to schedule a follow-up appointment with your primary care doctor or any specialists as instructed.     Discharge Plan:   Diet Plan: Discussed  Activity Level: Discussed  Confirmed Follow up Appointment: Patient to Call and Schedule Appointment  Confirmed Symptoms Management: Discussed  Medication Reconciliation Updated: Yes  Influenza Vaccine Indication: Indicated: Not available from distributor/    REASONS TO CALL YOUR OBSTETRICIAN:  1.   Persistent fever or shaking chills (Temperature higher than 100.4)  2.   Heavy bleeding (soaking more than 1 pad per hour); Passing clots  3.   Foul odor from vagina  4.   Mastitis (Breast infection; breast pain, chills, fever, redness)  5.   Urinary pain, burning or frequency  6.   Episiotomy infection  7.   Abdominal incision infection  8.   Severe depression longer than 24 hours    HAND WASHING  · Prior to handling the baby.  · Before breastfeeding or bottle feeding baby.  · After using the bathroom or changing the baby's diaper.    WOUND CARE  Ask your physician for additional care instructions.  In general:    ·  Incision:      · Keep clean and dry.    · Do NOT lift anything heavier than your baby for up to 6 weeks.    · There should not be any opening or pus.      VAGINAL CARE  · Nothing inside vagina for 6 weeks: no sexual intercourse, tampons or douching.  · Bleeding may continue for 2-4 weeks.  Amount may vary.    · Call your physician for heavy bleeding which means soaking more than 1 pad per hour    BIRTH CONTROL  · It is  "possible to become pregnant at any time after delivery and while breastfeeding.  · Plan to discuss a method of birth control with your physician at your follow up visit. visit.    DIET AND ELIMINATION  · Eating more fiber (bran cereal, fruits, and vegetables) and drinking plenty of fluids will help to avoid constipation.  · Urinary frequency after childbirth is normal.    POSTPARTUM BLUES  During the first few days after birth, you may experience a sense of the \"blues\" which may include impatience, irritability or even crying.  These feeling come and go quickly.  However, as many as 1 in 10 women experience emotional symptoms known as postpartum depression.    Postpartum depression:  May start as early as the second or third day after delivery or take several weeks or months to develop.  Symptoms of \"blues\" are present, but are more intense:  Crying spells; loss of appetite; feelings of hopelessness or loss of control; fear of touching the baby; over concern or no concern at all about the baby; little or no concern about your own appearance/caring for yourself; and/or inability to sleep or excessive sleeping.  Contact your physician if you are experiencing any of these symptoms.    Crisis Hotline:  · [place] Crisis Hotline:  1-284-RVOSZXP  Or 1-687.140.9744  · [place] Crisis Hotline:  1-641.977.5694  Or 847-639-4889    PREVENTING SHAKEN BABY:  If you are angry or stressed, PUT THE BABY IN THE CRIB, step away, take some deep breaths, and wait until you are calm to care for the baby.  DO NOT SHAKE THE BABY.  You are not alone, call a supporter for help.    · Crisis [facility] Center 24/7 crisis line 837-824-1295 or 1-312.817.9204  · You can also text them, text \"ANSWER\" to 548416    QUIT SMOKING/TOBACCO USE:  I understand the use of any tobacco products increases my chance of suffering from future heart disease and could cause other illnesses which may shorten my life. Quitting the use of tobacco products is the single most " important thing I can do to improve my health. For further information on smoking / tobacco cessation call a Toll Free Quit Line at 1-387.566.4081 (*[employer]) or 1-577.687.7540 (American Lung Association) or you can access the web based program at www.lungusa.org.    · [place] Tobacco Users Help Line:  (279) 846-5728       Toll Free: 1-255.383.7908  · Quit Tobacco Program [facility] (911)238-3102    DEPRESSION / SUICIDE RISK:  As you are discharged from this [facility], it is important to learn how to keep safe from harming yourself.    Recognize the warning signs:  · Abrupt changes in personality, positive or negative- including increase in energy   · Giving away possessions  · Change in eating patterns- significant weight changes-  positive or negative  · Change in sleeping patterns- unable to sleep or sleeping all the time   · Unwillingness or inability to communicate  · Depression  · Unusual sadness, discouragement and loneliness  · Talk of wanting to die  · Neglect of personal appearance   · Rebelliousness- reckless behavior  · Withdrawal from people/activities they love  · Confusion- inability to concentrate     If you or a loved one observes any of these behaviors or has concerns about self-harm, here's what you can do:  · Talk about it- your feelings and reasons for harming yourself  · Remove any means that you might use to hurt yourself (examples: pills, rope, extension cords, firearm)  · Get professional help from the community (Mental Health, Substance Abuse, psychological counseling)  · Do not be alone:Call your Safe Contact- someone whom you trust who will be there for you.  · Call your local CRISIS HOTLINE 444-7124 or 557-058-8090  · Call your local Children's Mobile Crisis Response Team [place] (242) 029-8552 or www.Deepclass  · Call the toll free National Suicide Prevention Hotlines   · National Suicide Prevention Lifeline 697-079-NUYH  (1486)  · [facility] 800-SUICIDE (841-5483)    DISCHARGE SURVEY:  Thank you for choosing [facility].  We hope we provided you with very good care.  You may be receiving a survey in the mail.  Please fill it out.  Your opinion is valuable to us.    ADDITIONAL EDUCATIONAL MATERIALS GIVEN TO PATIENT:  Pelvic rest for 6 weeks.  Follow up with me in the office in 6 weeks for a post partum visit.      My signature on this form indicates that:  1.  I have reviewed and understand the above information  2.  My questions regarding this information have been answered to my satisfaction.  3.  I have formulated a plan with my discharge nurse to obtain my prescribed medication for home.

## 2017-06-07 NOTE — CARE PLAN
Problem: Communication  Goal: The ability to communicate needs accurately and effectively will improve  Outcome: PROGRESSING AS EXPECTED  Pt. Ambulating to bathroom, voiding, taking adequate PO fluids. All questions and concerns answered.     Problem: Pain Management  Goal: Pain level will decrease to patient’s comfort goal  Outcome: PROGRESSING AS EXPECTED  Pt. Would like to keep PRN pain medications as scheduled to manage pain effectively.

## 2017-06-07 NOTE — PROGRESS NOTES
Discharge teaching reviewed with patient, all questions answered. Pt given all written information on self and infant care, including prescriptions and follow-up instructions. Pt doing well with infant, and feels comfortable with her feeding plan.  Discharged to home at 1300. Escorted out in wheelchair by staff.

## 2017-06-07 NOTE — CARE PLAN
Problem: Alteration in comfort related to episiotomy, vaginal repair and/or after birth pains  Goal: Patient verbalizes acceptable pain level  Outcome: PROGRESSING AS EXPECTED  Patient states pain is tolerable with pain medication.  Will continue to reassess with hourly rounding and medicate accordingly to 0-10 pain scale.    Problem: Potential knowledge deficit related to lack of understanding of self and  care  Goal: Patient will demonstrate ability to care for self and infant  Outcome: PROGRESSING AS EXPECTED  Patient demonstrates the ability to care for self and infant, recognizing baby cues and responding appropriately.

## 2017-07-20 NOTE — ADDENDUM NOTE
Encounter addended by: Geri Ji R.N. on: 7/19/2017  6:03 PM<BR>     Documentation filed: Inpatient Document Flowsheet

## 2019-09-06 ENCOUNTER — HOSPITAL ENCOUNTER (EMERGENCY)
Facility: MEDICAL CENTER | Age: 34
End: 2019-09-06
Attending: EMERGENCY MEDICINE
Payer: MEDICAID

## 2019-09-06 ENCOUNTER — APPOINTMENT (OUTPATIENT)
Dept: RADIOLOGY | Facility: MEDICAL CENTER | Age: 34
End: 2019-09-06
Attending: EMERGENCY MEDICINE
Payer: MEDICAID

## 2019-09-06 VITALS
OXYGEN SATURATION: 98 % | DIASTOLIC BLOOD PRESSURE: 77 MMHG | TEMPERATURE: 97.9 F | HEIGHT: 61 IN | BODY MASS INDEX: 31.97 KG/M2 | HEART RATE: 69 BPM | RESPIRATION RATE: 14 BRPM | SYSTOLIC BLOOD PRESSURE: 109 MMHG | WEIGHT: 169.31 LBS

## 2019-09-06 DIAGNOSIS — K59.00 CONSTIPATION, UNSPECIFIED CONSTIPATION TYPE: ICD-10-CM

## 2019-09-06 DIAGNOSIS — K46.9 ABDOMINAL HERNIA WITHOUT OBSTRUCTION AND WITHOUT GANGRENE, RECURRENCE NOT SPECIFIED, UNSPECIFIED HERNIA TYPE: ICD-10-CM

## 2019-09-06 LAB
ALBUMIN SERPL BCP-MCNC: 4.2 G/DL (ref 3.2–4.9)
ALBUMIN/GLOB SERPL: 1.3 G/DL
ALP SERPL-CCNC: 65 U/L (ref 30–99)
ALT SERPL-CCNC: 14 U/L (ref 2–50)
ANION GAP SERPL CALC-SCNC: 8 MMOL/L (ref 0–11.9)
AST SERPL-CCNC: 16 U/L (ref 12–45)
BASOPHILS # BLD AUTO: 0.6 % (ref 0–1.8)
BASOPHILS # BLD: 0.03 K/UL (ref 0–0.12)
BILIRUB SERPL-MCNC: 0.4 MG/DL (ref 0.1–1.5)
BUN SERPL-MCNC: 9 MG/DL (ref 8–22)
CALCIUM SERPL-MCNC: 9 MG/DL (ref 8.5–10.5)
CHLORIDE SERPL-SCNC: 106 MMOL/L (ref 96–112)
CO2 SERPL-SCNC: 25 MMOL/L (ref 20–33)
CREAT SERPL-MCNC: 0.57 MG/DL (ref 0.5–1.4)
EOSINOPHIL # BLD AUTO: 0.14 K/UL (ref 0–0.51)
EOSINOPHIL NFR BLD: 2.6 % (ref 0–6.9)
ERYTHROCYTE [DISTWIDTH] IN BLOOD BY AUTOMATED COUNT: 44.8 FL (ref 35.9–50)
GLOBULIN SER CALC-MCNC: 3.3 G/DL (ref 1.9–3.5)
GLUCOSE SERPL-MCNC: 99 MG/DL (ref 65–99)
HCT VFR BLD AUTO: 42 % (ref 37–47)
HGB BLD-MCNC: 13.1 G/DL (ref 12–16)
IMM GRANULOCYTES # BLD AUTO: 0.01 K/UL (ref 0–0.11)
IMM GRANULOCYTES NFR BLD AUTO: 0.2 % (ref 0–0.9)
LYMPHOCYTES # BLD AUTO: 1.69 K/UL (ref 1–4.8)
LYMPHOCYTES NFR BLD: 31.4 % (ref 22–41)
MCH RBC QN AUTO: 28.7 PG (ref 27–33)
MCHC RBC AUTO-ENTMCNC: 31.2 G/DL (ref 33.6–35)
MCV RBC AUTO: 91.9 FL (ref 81.4–97.8)
MONOCYTES # BLD AUTO: 0.42 K/UL (ref 0–0.85)
MONOCYTES NFR BLD AUTO: 7.8 % (ref 0–13.4)
NEUTROPHILS # BLD AUTO: 3.1 K/UL (ref 2–7.15)
NEUTROPHILS NFR BLD: 57.4 % (ref 44–72)
NRBC # BLD AUTO: 0 K/UL
NRBC BLD-RTO: 0 /100 WBC
PLATELET # BLD AUTO: 301 K/UL (ref 164–446)
PMV BLD AUTO: 10.9 FL (ref 9–12.9)
POTASSIUM SERPL-SCNC: 3.5 MMOL/L (ref 3.6–5.5)
PROT SERPL-MCNC: 7.5 G/DL (ref 6–8.2)
RBC # BLD AUTO: 4.57 M/UL (ref 4.2–5.4)
SODIUM SERPL-SCNC: 139 MMOL/L (ref 135–145)
WBC # BLD AUTO: 5.4 K/UL (ref 4.8–10.8)

## 2019-09-06 PROCEDURE — 85025 COMPLETE CBC W/AUTO DIFF WBC: CPT

## 2019-09-06 PROCEDURE — 74019 RADEX ABDOMEN 2 VIEWS: CPT

## 2019-09-06 PROCEDURE — 99284 EMERGENCY DEPT VISIT MOD MDM: CPT

## 2019-09-06 PROCEDURE — 80053 COMPREHEN METABOLIC PANEL: CPT

## 2019-09-06 NOTE — ED TRIAGE NOTES
"Chief Complaint   Patient presents with   • Hernia     possible abd hernia. reports she feels a ball in her upper abd that buldges out sometimes.    • Constipation     x3 days.      Pt to triage for above. +N. Took miralax and tea without relief.   NAD noted. VSS.    /71   Pulse 75   Temp 36.6 °C (97.9 °F) (Temporal)   Resp 14   Ht 1.549 m (5' 1\")   Wt 76.8 kg (169 lb 5 oz)   SpO2 96%   BMI 31.99 kg/m²     "

## 2019-09-07 NOTE — ED PROVIDER NOTES
"ED Provider Note    CHIEF COMPLAINT  Chief Complaint   Patient presents with   • Hernia     possible abd hernia. reports she feels a ball in her upper abd that buldges out sometimes.    • Constipation     x3 days.        HPI  Ainsley Baker is a 33 y.o. female who presents to emerge from today with complaints of possible hernia and constipation.  Patient states over the last several days to 1 week she is felt \"ball\" to right mid abdomen that was worse when she developed constipation and exerting feels a pop out and goes back and causing some discomfort.  No previous history of this also feels constipated last bowel movement was today but was hard and does feel somewhat bloated no nausea no vomiting no fever chills denies blood in her stool.    REVIEW OF SYSTEMS  See HPI for further details. All other systems are negative.     PAST MEDICAL HISTORY  No past medical history on file.    FAMILY HISTORY  [unfilled]    SOCIAL HISTORY  Social History     Socioeconomic History   • Marital status: Single     Spouse name: Not on file   • Number of children: Not on file   • Years of education: Not on file   • Highest education level: Not on file   Occupational History   • Not on file   Social Needs   • Financial resource strain: Not on file   • Food insecurity:     Worry: Not on file     Inability: Not on file   • Transportation needs:     Medical: Not on file     Non-medical: Not on file   Tobacco Use   • Smoking status: Never Smoker   • Smokeless tobacco: Never Used   Substance and Sexual Activity   • Alcohol use: Yes     Comment: socially when not pregnant   • Drug use: No   • Sexual activity: Not on file   Lifestyle   • Physical activity:     Days per week: Not on file     Minutes per session: Not on file   • Stress: Not on file   Relationships   • Social connections:     Talks on phone: Not on file     Gets together: Not on file     Attends Latter-day service: Not on file     Active member of club or organization: Not " "on file     Attends meetings of clubs or organizations: Not on file     Relationship status: Not on file   • Intimate partner violence:     Fear of current or ex partner: Not on file     Emotionally abused: Not on file     Physically abused: Not on file     Forced sexual activity: Not on file   Other Topics Concern   • Not on file   Social History Narrative   • Not on file       SURGICAL HISTORY  Past Surgical History:   Procedure Laterality Date   • ABDOMINOPLASTY  2011   • INGUINAL HERNIA REPAIR CHILD         CURRENT MEDICATIONS  Home Medications     Reviewed by Kennedy Cano R.N. (Registered Nurse) on 09/06/19 at 1120  Med List Status: Partial   Medication Last Dose Status   ibuprofen (MOTRIN) 600 MG Tab  Active                ALLERGIES  No Known Allergies    PHYSICAL EXAM  VITAL SIGNS: /77   Pulse 69   Temp 36.6 °C (97.9 °F) (Temporal)   Resp 14   Ht 1.549 m (5' 1\")   Wt 76.8 kg (169 lb 5 oz)   SpO2 98%   BMI 31.99 kg/m²  Room air O2: 98    Constitutional: Well developed, Well nourished, No acute distress, Non-toxic appearance.   HENT: Normocephalic, Atraumatic, Bilateral external ears normal, Oropharynx moist, No oral exudates, Nose normal.   Eyes: PERRLA, EOMI, Conjunctiva normal, No discharge.   Neck: Normal range of motion, No tenderness, Supple, No stridor.   Lymphatic: No lymphadenopathy noted.   Cardiovascular: Normal heart rate, Normal rhythm, No murmurs, No rubs, No gallops.   Thorax & Lungs: Normal breath sounds, No respiratory distress, No wheezing, No chest tenderness.   Abdomen: Bowel sounds normal, Soft, No tenderness, there is area of 2 cm on the right mid upper abdominal area that appears to be herniate abdominal easily reducible some tenderness no other masses or lesions noted, No pulsatile masses.   Skin: Warm, Dry, No erythema, No rash.   Back: No tenderness, No CVA tenderness.     Extremities: Intact distal pulses, No edema, No tenderness, No cyanosis, No clubbing. "   Musculoskeletal: Good range of motion in all major joints. No tenderness to palpation or major deformities noted.   Neurologic: Alert & oriented x 3, Normal motor function, Normal sensory function, No focal deficits noted.   Psychiatric: Affect normal, Judgment normal, Mood normal.       RADIOLOGY/PROCEDURES  [id number] VIEWS   Final Result      No evidence of bowel obstruction.   Mildly distended small bowel loops and air-fluid levels which could indicate ileus or enteritis.            COURSE & MEDICAL DECISION MAKING  Pertinent Labs & Imaging studies reviewed. (See chart for details)  No evidence of obstruction patient does have a significant amount stool noted will place patient on GoLYTELY she is tried MiraLAX already without success given referral to surgeon Dr. Scales who is on-call for evaluation of hernia.  Patient is asymptomatic at this time she develops pain, vomiting, fever worsening symptoms next 12 to 24 hours to return to the emergency room on reexamination prior to discharge abdomen soft, supple, nonsurgical.    FINAL IMPRESSION  1.  Abdominal hernia  2.  Constipation  3.         Electronically signed by: Angelo Foster, 9/6/2019 5:43 PM

## 2020-09-23 ENCOUNTER — HOSPITAL ENCOUNTER (EMERGENCY)
Facility: MEDICAL CENTER | Age: 35
End: 2020-09-23
Attending: EMERGENCY MEDICINE
Payer: MEDICAID

## 2020-09-23 ENCOUNTER — APPOINTMENT (OUTPATIENT)
Dept: RADIOLOGY | Facility: MEDICAL CENTER | Age: 35
End: 2020-09-23
Attending: EMERGENCY MEDICINE
Payer: MEDICAID

## 2020-09-23 VITALS
TEMPERATURE: 98 F | RESPIRATION RATE: 10 BRPM | DIASTOLIC BLOOD PRESSURE: 67 MMHG | OXYGEN SATURATION: 100 % | SYSTOLIC BLOOD PRESSURE: 118 MMHG | WEIGHT: 188.93 LBS | HEIGHT: 62 IN | BODY MASS INDEX: 34.77 KG/M2 | HEART RATE: 54 BPM

## 2020-09-23 DIAGNOSIS — R10.33 PERIUMBILICAL ABDOMINAL PAIN: ICD-10-CM

## 2020-09-23 LAB
ALBUMIN SERPL BCP-MCNC: 4.5 G/DL (ref 3.2–4.9)
ALBUMIN/GLOB SERPL: 1.6 G/DL
ALP SERPL-CCNC: 75 U/L (ref 30–99)
ALT SERPL-CCNC: 15 U/L (ref 2–50)
ANION GAP SERPL CALC-SCNC: 12 MMOL/L (ref 7–16)
APPEARANCE UR: CLEAR
AST SERPL-CCNC: 11 U/L (ref 12–45)
BACTERIA #/AREA URNS HPF: NEGATIVE /HPF
BASOPHILS # BLD AUTO: 0.3 % (ref 0–1.8)
BASOPHILS # BLD: 0.02 K/UL (ref 0–0.12)
BILIRUB SERPL-MCNC: 0.4 MG/DL (ref 0.1–1.5)
BILIRUB UR QL STRIP.AUTO: NEGATIVE
BUN SERPL-MCNC: 11 MG/DL (ref 8–22)
CALCIUM SERPL-MCNC: 9.3 MG/DL (ref 8.5–10.5)
CHLORIDE SERPL-SCNC: 101 MMOL/L (ref 96–112)
CO2 SERPL-SCNC: 24 MMOL/L (ref 20–33)
COLOR UR: YELLOW
CREAT SERPL-MCNC: 0.56 MG/DL (ref 0.5–1.4)
EOSINOPHIL # BLD AUTO: 0.13 K/UL (ref 0–0.51)
EOSINOPHIL NFR BLD: 2.3 % (ref 0–6.9)
EPI CELLS #/AREA URNS HPF: NEGATIVE /HPF
ERYTHROCYTE [DISTWIDTH] IN BLOOD BY AUTOMATED COUNT: 45.7 FL (ref 35.9–50)
GLOBULIN SER CALC-MCNC: 2.9 G/DL (ref 1.9–3.5)
GLUCOSE SERPL-MCNC: 85 MG/DL (ref 65–99)
GLUCOSE UR STRIP.AUTO-MCNC: NEGATIVE MG/DL
HCG SERPL QL: NEGATIVE
HCT VFR BLD AUTO: 40.3 % (ref 37–47)
HGB BLD-MCNC: 12.8 G/DL (ref 12–16)
HYALINE CASTS #/AREA URNS LPF: ABNORMAL /LPF
IMM GRANULOCYTES # BLD AUTO: 0.01 K/UL (ref 0–0.11)
IMM GRANULOCYTES NFR BLD AUTO: 0.2 % (ref 0–0.9)
KETONES UR STRIP.AUTO-MCNC: NEGATIVE MG/DL
LEUKOCYTE ESTERASE UR QL STRIP.AUTO: NEGATIVE
LIPASE SERPL-CCNC: 18 U/L (ref 11–82)
LYMPHOCYTES # BLD AUTO: 2.11 K/UL (ref 1–4.8)
LYMPHOCYTES NFR BLD: 36.8 % (ref 22–41)
MCH RBC QN AUTO: 29.6 PG (ref 27–33)
MCHC RBC AUTO-ENTMCNC: 31.8 G/DL (ref 33.6–35)
MCV RBC AUTO: 93.3 FL (ref 81.4–97.8)
MICRO URNS: ABNORMAL
MONOCYTES # BLD AUTO: 0.49 K/UL (ref 0–0.85)
MONOCYTES NFR BLD AUTO: 8.5 % (ref 0–13.4)
NEUTROPHILS # BLD AUTO: 2.98 K/UL (ref 2–7.15)
NEUTROPHILS NFR BLD: 51.9 % (ref 44–72)
NITRITE UR QL STRIP.AUTO: NEGATIVE
NRBC # BLD AUTO: 0 K/UL
NRBC BLD-RTO: 0 /100 WBC
PH UR STRIP.AUTO: 7 [PH] (ref 5–8)
PLATELET # BLD AUTO: 315 K/UL (ref 164–446)
PMV BLD AUTO: 10.3 FL (ref 9–12.9)
POTASSIUM SERPL-SCNC: 4.2 MMOL/L (ref 3.6–5.5)
PROT SERPL-MCNC: 7.4 G/DL (ref 6–8.2)
PROT UR QL STRIP: NEGATIVE MG/DL
RBC # BLD AUTO: 4.32 M/UL (ref 4.2–5.4)
RBC # URNS HPF: ABNORMAL /HPF
RBC UR QL AUTO: ABNORMAL
SODIUM SERPL-SCNC: 137 MMOL/L (ref 135–145)
SP GR UR STRIP.AUTO: 1.02
UROBILINOGEN UR STRIP.AUTO-MCNC: 0.2 MG/DL
WBC # BLD AUTO: 5.7 K/UL (ref 4.8–10.8)
WBC #/AREA URNS HPF: ABNORMAL /HPF

## 2020-09-23 PROCEDURE — 74177 CT ABD & PELVIS W/CONTRAST: CPT

## 2020-09-23 PROCEDURE — 96374 THER/PROPH/DIAG INJ IV PUSH: CPT

## 2020-09-23 PROCEDURE — 81001 URINALYSIS AUTO W/SCOPE: CPT

## 2020-09-23 PROCEDURE — 700117 HCHG RX CONTRAST REV CODE 255: Performed by: EMERGENCY MEDICINE

## 2020-09-23 PROCEDURE — 85025 COMPLETE CBC W/AUTO DIFF WBC: CPT

## 2020-09-23 PROCEDURE — 99284 EMERGENCY DEPT VISIT MOD MDM: CPT

## 2020-09-23 PROCEDURE — 700111 HCHG RX REV CODE 636 W/ 250 OVERRIDE (IP): Performed by: EMERGENCY MEDICINE

## 2020-09-23 PROCEDURE — 83690 ASSAY OF LIPASE: CPT

## 2020-09-23 PROCEDURE — 36415 COLL VENOUS BLD VENIPUNCTURE: CPT

## 2020-09-23 PROCEDURE — 80053 COMPREHEN METABOLIC PANEL: CPT

## 2020-09-23 PROCEDURE — 84703 CHORIONIC GONADOTROPIN ASSAY: CPT

## 2020-09-23 RX ORDER — ONDANSETRON 2 MG/ML
4 INJECTION INTRAMUSCULAR; INTRAVENOUS ONCE
Status: COMPLETED | OUTPATIENT
Start: 2020-09-23 | End: 2020-09-23

## 2020-09-23 RX ADMIN — IOHEXOL 100 ML: 350 INJECTION, SOLUTION INTRAVENOUS at 15:48

## 2020-09-23 RX ADMIN — ONDANSETRON 4 MG: 2 INJECTION INTRAMUSCULAR; INTRAVENOUS at 14:40

## 2020-09-23 ASSESSMENT — LIFESTYLE VARIABLES: DO YOU DRINK ALCOHOL: NO

## 2020-09-23 ASSESSMENT — FIBROSIS 4 INDEX: FIB4 SCORE: 0.45

## 2020-09-23 NOTE — ED NOTES
Received orders for DC. PIV removed and dressing applied. Educated regarding f/u with Dr Lara and general abdominal pain instructions. VSS. Ambulatory with a steady gait to the lobby. Provided with a work note.

## 2020-09-23 NOTE — ED PROVIDER NOTES
"ED Provider Note    CHIEF COMPLAINT  Chief Complaint   Patient presents with   • Abdominal Pain     in mid abdomen x 4 months. diagnosed w/hernia few months ago. today pain is worse describes as burning pain and abdomen feels \"swollen\".    • Nausea     this sunday.        HPI  Ainsley Baker is a 34 y.o. female who presents with acute lower abdominal discomfort.  She reports feeling distended.  The patient was apparently diagnosed with a hernia several months ago never underwent repair.  She reports acute onset of pain earlier today.  Is described as burning she has associated nausea without hematemesis hematochezia.  No reported melena.  No high fevers or chills.  She denies pregnancy.  Pain is worse with coughing and bearing down, comes and goes in waves nothing seems to make it better but coughing does make it worse    REVIEW OF SYSTEMS  See HPI for further details.  No high fevers hematemesis hematochezia melena all other systems are negative.     PAST MEDICAL HISTORY  No past medical history on file.  No stated medical history  FAMILY HISTORY  Noncontributory    SOCIAL HISTORY  Social History     Socioeconomic History   • Marital status: Single     Spouse name: Not on file   • Number of children: Not on file   • Years of education: Not on file   • Highest education level: Not on file   Occupational History   • Not on file   Social Needs   • Financial resource strain: Not on file   • Food insecurity     Worry: Not on file     Inability: Not on file   • Transportation needs     Medical: Not on file     Non-medical: Not on file   Tobacco Use   • Smoking status: Never Smoker   • Smokeless tobacco: Never Used   Substance and Sexual Activity   • Alcohol use: Yes     Comment: socially when not pregnant   • Drug use: No   • Sexual activity: Not on file   Lifestyle   • Physical activity     Days per week: Not on file     Minutes per session: Not on file   • Stress: Not on file   Relationships   • Social " "connections     Talks on phone: Not on file     Gets together: Not on file     Attends Islam service: Not on file     Active member of club or organization: Not on file     Attends meetings of clubs or organizations: Not on file     Relationship status: Not on file   • Intimate partner violence     Fear of current or ex partner: Not on file     Emotionally abused: Not on file     Physically abused: Not on file     Forced sexual activity: Not on file   Other Topics Concern   • Not on file   Social History Narrative   • Not on file   Denies IV drugs    SURGICAL HISTORY  Past Surgical History:   Procedure Laterality Date   • ABDOMINOPLASTY  2011   • INGUINAL HERNIA REPAIR CHILD         CURRENT MEDICATIONS  Home Medications     Reviewed by Marialuisa Andrews R.N. (Registered Nurse) on 09/23/20 at 1050  Med List Status: Complete   Medication Last Dose Status   ibuprofen (MOTRIN) 600 MG Tab  Active                ALLERGIES  No Known Allergies    PHYSICAL EXAM  VITAL SIGNS: /81   Pulse 65   Temp 36.8 °C (98.2 °F) (Temporal)   Resp 18   Ht 1.575 m (5' 2\")   Wt 85.7 kg (188 lb 15 oz)   LMP 09/17/2020   SpO2 98%   BMI 34.56 kg/m²       Constitutional: Well developed, Well nourished, No acute distress, Non-toxic appearance.   HENT: Normocephalic, Atraumatic, Bilateral external ears normal, Oropharynx moist, No oral exudates, Nose normal.   Eyes: PERRLA, EOMI, Conjunctiva normal, No discharge.   Neck: Normal range of motion, No tenderness, Supple, No stridor.    Cardiovascular: Normal heart rate, Normal rhythm, No murmurs, No rubs, No gallops.   Thorax & Lungs: Normal breath sounds, No respiratory distress, No wheezing, No chest tenderness.   Abdomen: Bowel sounds normal, Soft, tenderness in the mid to lower abdomen, possible mass palpated consistent with possible reducible hernia  Skin: Warm, Dry, No erythema, No rash.   Back: No tenderness, No CVA tenderness.   Extremities: Intact distal pulses, No edema, No " tenderness, No cyanosis, No clubbing.   Neurologic: Alert & oriented x 3, Normal motor function, Normal sensory function, No focal deficits noted.   Psychiatric: Affect normal, Judgment normal, Mood normal.   CT-ABDOMEN-PELVIS WITH   Final Result      No acute abnormality in the abdomen or pelvis.        Results for orders placed or performed during the hospital encounter of 09/23/20   CBC WITH DIFFERENTIAL   Result Value Ref Range    WBC 5.7 4.8 - 10.8 K/uL    RBC 4.32 4.20 - 5.40 M/uL    Hemoglobin 12.8 12.0 - 16.0 g/dL    Hematocrit 40.3 37.0 - 47.0 %    MCV 93.3 81.4 - 97.8 fL    MCH 29.6 27.0 - 33.0 pg    MCHC 31.8 (L) 33.6 - 35.0 g/dL    RDW 45.7 35.9 - 50.0 fL    Platelet Count 315 164 - 446 K/uL    MPV 10.3 9.0 - 12.9 fL    Neutrophils-Polys 51.90 44.00 - 72.00 %    Lymphocytes 36.80 22.00 - 41.00 %    Monocytes 8.50 0.00 - 13.40 %    Eosinophils 2.30 0.00 - 6.90 %    Basophils 0.30 0.00 - 1.80 %    Immature Granulocytes 0.20 0.00 - 0.90 %    Nucleated RBC 0.00 /100 WBC    Neutrophils (Absolute) 2.98 2.00 - 7.15 K/uL    Lymphs (Absolute) 2.11 1.00 - 4.80 K/uL    Monos (Absolute) 0.49 0.00 - 0.85 K/uL    Eos (Absolute) 0.13 0.00 - 0.51 K/uL    Baso (Absolute) 0.02 0.00 - 0.12 K/uL    Immature Granulocytes (abs) 0.01 0.00 - 0.11 K/uL    NRBC (Absolute) 0.00 K/uL   COMP METABOLIC PANEL   Result Value Ref Range    Sodium 137 135 - 145 mmol/L    Potassium 4.2 3.6 - 5.5 mmol/L    Chloride 101 96 - 112 mmol/L    Co2 24 20 - 33 mmol/L    Anion Gap 12.0 7.0 - 16.0    Glucose 85 65 - 99 mg/dL    Bun 11 8 - 22 mg/dL    Creatinine 0.56 0.50 - 1.40 mg/dL    Calcium 9.3 8.5 - 10.5 mg/dL    AST(SGOT) 11 (L) 12 - 45 U/L    ALT(SGPT) 15 2 - 50 U/L    Alkaline Phosphatase 75 30 - 99 U/L    Total Bilirubin 0.4 0.1 - 1.5 mg/dL    Albumin 4.5 3.2 - 4.9 g/dL    Total Protein 7.4 6.0 - 8.2 g/dL    Globulin 2.9 1.9 - 3.5 g/dL    A-G Ratio 1.6 g/dL   LIPASE   Result Value Ref Range    Lipase 18 11 - 82 U/L   URINALYSIS,CULTURE IF  INDICATED    Specimen: Blood   Result Value Ref Range    Color Yellow     Character Clear     Specific Gravity 1.020 <1.035    Ph 7.0 5.0 - 8.0    Glucose Negative Negative mg/dL    Ketones Negative Negative mg/dL    Protein Negative Negative mg/dL    Bilirubin Negative Negative    Urobilinogen, Urine 0.2 Negative    Nitrite Negative Negative    Leukocyte Esterase Negative Negative    Occult Blood Trace (A) Negative    Micro Urine Req Microscopic    URINE MICROSCOPIC (W/UA)   Result Value Ref Range    WBC 0-2 /hpf    RBC 5-10 (A) /hpf    Bacteria Negative None /hpf    Epithelial Cells Negative /hpf    Hyaline Cast 0-2 /lpf   HCG QUAL SERUM   Result Value Ref Range    Beta-Hcg Qualitative Serum Negative Negative   ESTIMATED GFR   Result Value Ref Range    GFR If African American >60 >60 mL/min/1.73 m 2    GFR If Non African American >60 >60 mL/min/1.73 m 2         COURSE & MEDICAL DECISION MAKING  Pertinent Labs & Imaging studies reviewed. (See chart for details)  An IV was established.  The patient was given some nausea medicine.  She did not require parenteral pain medication.  Laboratory studies including CBC metabolic panel lipase are normal.  She is not pregnant.  CT scan did not demonstrate any acute obstructive or inflammatory process.  To have the patient stand up I can palpate a bulging mass just superior to the umbilicus that is worse with bearing down and coughing.  I suspect she may be developing a small hernia there is no suggestion of incarceration and strangulation etc.  She will be referred to general surgery.  I have counseled her on avoiding heavy lifting and bearing down.  She might need to take some over-the-counter stool softeners    FINAL IMPRESSION  1.  Abdominal pain  2.  Possible ventral wall hernia      Electronically signed by: Joseluis Seymour M.D., 9/23/2020 2:20 PM

## 2020-09-23 NOTE — ED TRIAGE NOTES
"Chief Complaint   Patient presents with   • Abdominal Pain     in mid abdomen x 4 months. diagnosed w/hernia few months ago. today pain is worse describes as burning pain and abdomen feels \"swollen\".    • Nausea     this sunday.      Educated on triage process. Instructed to notify staff for any worsening symptoms. Denies any recent travel. Denies exposure to known covid positive patients. Denies any respiratory symptoms.   "

## 2022-09-07 ENCOUNTER — APPOINTMENT (OUTPATIENT)
Dept: RADIOLOGY | Facility: MEDICAL CENTER | Age: 37
End: 2022-09-07
Attending: EMERGENCY MEDICINE
Payer: COMMERCIAL

## 2022-09-07 ENCOUNTER — HOSPITAL ENCOUNTER (EMERGENCY)
Facility: MEDICAL CENTER | Age: 37
End: 2022-09-07
Attending: EMERGENCY MEDICINE
Payer: COMMERCIAL

## 2022-09-07 VITALS
HEIGHT: 62 IN | TEMPERATURE: 98.1 F | DIASTOLIC BLOOD PRESSURE: 62 MMHG | HEART RATE: 66 BPM | OXYGEN SATURATION: 96 % | BODY MASS INDEX: 32.13 KG/M2 | SYSTOLIC BLOOD PRESSURE: 127 MMHG | RESPIRATION RATE: 16 BRPM | WEIGHT: 174.6 LBS

## 2022-09-07 DIAGNOSIS — N83.201 CYST OF RIGHT OVARY: ICD-10-CM

## 2022-09-07 DIAGNOSIS — N39.0 ACUTE UTI: ICD-10-CM

## 2022-09-07 LAB
ALBUMIN SERPL BCP-MCNC: 4.3 G/DL (ref 3.2–4.9)
ALBUMIN/GLOB SERPL: 1.6 G/DL
ALP SERPL-CCNC: 88 U/L (ref 30–99)
ALT SERPL-CCNC: 10 U/L (ref 2–50)
ANION GAP SERPL CALC-SCNC: 9 MMOL/L (ref 7–16)
APPEARANCE UR: CLEAR
AST SERPL-CCNC: 13 U/L (ref 12–45)
BACTERIA #/AREA URNS HPF: NEGATIVE /HPF
BASOPHILS # BLD AUTO: 0.4 % (ref 0–1.8)
BASOPHILS # BLD: 0.04 K/UL (ref 0–0.12)
BILIRUB SERPL-MCNC: 0.3 MG/DL (ref 0.1–1.5)
BILIRUB UR QL STRIP.AUTO: NEGATIVE
BUN SERPL-MCNC: 13 MG/DL (ref 8–22)
CALCIUM SERPL-MCNC: 9 MG/DL (ref 8.5–10.5)
CHLORIDE SERPL-SCNC: 102 MMOL/L (ref 96–112)
CO2 SERPL-SCNC: 24 MMOL/L (ref 20–33)
COLOR UR: YELLOW
CREAT SERPL-MCNC: 0.55 MG/DL (ref 0.5–1.4)
EOSINOPHIL # BLD AUTO: 0.19 K/UL (ref 0–0.51)
EOSINOPHIL NFR BLD: 1.9 % (ref 0–6.9)
EPI CELLS #/AREA URNS HPF: NEGATIVE /HPF
ERYTHROCYTE [DISTWIDTH] IN BLOOD BY AUTOMATED COUNT: 45.1 FL (ref 35.9–50)
GFR SERPLBLD CREATININE-BSD FMLA CKD-EPI: 123 ML/MIN/1.73 M 2
GLOBULIN SER CALC-MCNC: 2.7 G/DL (ref 1.9–3.5)
GLUCOSE SERPL-MCNC: 101 MG/DL (ref 65–99)
GLUCOSE UR STRIP.AUTO-MCNC: NEGATIVE MG/DL
HCG SERPL QL: NEGATIVE
HCT VFR BLD AUTO: 40.7 % (ref 37–47)
HGB BLD-MCNC: 13.5 G/DL (ref 12–16)
HYALINE CASTS #/AREA URNS LPF: ABNORMAL /LPF
IMM GRANULOCYTES # BLD AUTO: 0.04 K/UL (ref 0–0.11)
IMM GRANULOCYTES NFR BLD AUTO: 0.4 % (ref 0–0.9)
KETONES UR STRIP.AUTO-MCNC: NEGATIVE MG/DL
LEUKOCYTE ESTERASE UR QL STRIP.AUTO: ABNORMAL
LIPASE SERPL-CCNC: 23 U/L (ref 11–82)
LYMPHOCYTES # BLD AUTO: 2.2 K/UL (ref 1–4.8)
LYMPHOCYTES NFR BLD: 22.5 % (ref 22–41)
MCH RBC QN AUTO: 30.4 PG (ref 27–33)
MCHC RBC AUTO-ENTMCNC: 33.2 G/DL (ref 33.6–35)
MCV RBC AUTO: 91.7 FL (ref 81.4–97.8)
MICRO URNS: ABNORMAL
MONOCYTES # BLD AUTO: 0.63 K/UL (ref 0–0.85)
MONOCYTES NFR BLD AUTO: 6.4 % (ref 0–13.4)
NEUTROPHILS # BLD AUTO: 6.69 K/UL (ref 2–7.15)
NEUTROPHILS NFR BLD: 68.4 % (ref 44–72)
NITRITE UR QL STRIP.AUTO: NEGATIVE
NRBC # BLD AUTO: 0 K/UL
NRBC BLD-RTO: 0 /100 WBC
PH UR STRIP.AUTO: 6.5 [PH] (ref 5–8)
PLATELET # BLD AUTO: 315 K/UL (ref 164–446)
PMV BLD AUTO: 10.6 FL (ref 9–12.9)
POTASSIUM SERPL-SCNC: 4.1 MMOL/L (ref 3.6–5.5)
PROT SERPL-MCNC: 7 G/DL (ref 6–8.2)
PROT UR QL STRIP: NEGATIVE MG/DL
RBC # BLD AUTO: 4.44 M/UL (ref 4.2–5.4)
RBC # URNS HPF: ABNORMAL /HPF
RBC UR QL AUTO: ABNORMAL
SODIUM SERPL-SCNC: 135 MMOL/L (ref 135–145)
SP GR UR STRIP.AUTO: 1.03
UROBILINOGEN UR STRIP.AUTO-MCNC: 0.2 MG/DL
WBC # BLD AUTO: 9.8 K/UL (ref 4.8–10.8)
WBC #/AREA URNS HPF: ABNORMAL /HPF

## 2022-09-07 PROCEDURE — 96374 THER/PROPH/DIAG INJ IV PUSH: CPT

## 2022-09-07 PROCEDURE — 87086 URINE CULTURE/COLONY COUNT: CPT

## 2022-09-07 PROCEDURE — 85025 COMPLETE CBC W/AUTO DIFF WBC: CPT

## 2022-09-07 PROCEDURE — 36415 COLL VENOUS BLD VENIPUNCTURE: CPT

## 2022-09-07 PROCEDURE — 74176 CT ABD & PELVIS W/O CONTRAST: CPT

## 2022-09-07 PROCEDURE — 700105 HCHG RX REV CODE 258: Performed by: EMERGENCY MEDICINE

## 2022-09-07 PROCEDURE — 76856 US EXAM PELVIC COMPLETE: CPT

## 2022-09-07 PROCEDURE — 700111 HCHG RX REV CODE 636 W/ 250 OVERRIDE (IP): Performed by: EMERGENCY MEDICINE

## 2022-09-07 PROCEDURE — 80053 COMPREHEN METABOLIC PANEL: CPT

## 2022-09-07 PROCEDURE — 84703 CHORIONIC GONADOTROPIN ASSAY: CPT

## 2022-09-07 PROCEDURE — 96375 TX/PRO/DX INJ NEW DRUG ADDON: CPT

## 2022-09-07 PROCEDURE — 99285 EMERGENCY DEPT VISIT HI MDM: CPT

## 2022-09-07 PROCEDURE — 83690 ASSAY OF LIPASE: CPT

## 2022-09-07 PROCEDURE — 81001 URINALYSIS AUTO W/SCOPE: CPT

## 2022-09-07 RX ORDER — ONDANSETRON 2 MG/ML
4 INJECTION INTRAMUSCULAR; INTRAVENOUS ONCE
Status: COMPLETED | OUTPATIENT
Start: 2022-09-07 | End: 2022-09-07

## 2022-09-07 RX ORDER — PHENAZOPYRIDINE HYDROCHLORIDE 200 MG/1
200 TABLET, FILM COATED ORAL 3 TIMES DAILY PRN
Qty: 6 TABLET | Refills: 0 | Status: SHIPPED | OUTPATIENT
Start: 2022-09-07 | End: 2023-08-18

## 2022-09-07 RX ORDER — SODIUM CHLORIDE 9 MG/ML
1000 INJECTION, SOLUTION INTRAVENOUS ONCE
Status: COMPLETED | OUTPATIENT
Start: 2022-09-07 | End: 2022-09-07

## 2022-09-07 RX ORDER — CEFTRIAXONE 2 G/1
2 INJECTION, POWDER, FOR SOLUTION INTRAMUSCULAR; INTRAVENOUS ONCE
Status: COMPLETED | OUTPATIENT
Start: 2022-09-07 | End: 2022-09-07

## 2022-09-07 RX ORDER — CEPHALEXIN 500 MG/1
500 CAPSULE ORAL 3 TIMES DAILY
Qty: 15 CAPSULE | Refills: 0 | Status: SHIPPED | OUTPATIENT
Start: 2022-09-07 | End: 2022-09-12

## 2022-09-07 RX ORDER — KETOROLAC TROMETHAMINE 30 MG/ML
15 INJECTION, SOLUTION INTRAMUSCULAR; INTRAVENOUS ONCE
Status: COMPLETED | OUTPATIENT
Start: 2022-09-07 | End: 2022-09-07

## 2022-09-07 RX ADMIN — KETOROLAC TROMETHAMINE 15 MG: 30 INJECTION, SOLUTION INTRAMUSCULAR; INTRAVENOUS at 12:50

## 2022-09-07 RX ADMIN — SODIUM CHLORIDE 1000 ML: 9 INJECTION, SOLUTION INTRAVENOUS at 11:31

## 2022-09-07 RX ADMIN — CEFTRIAXONE SODIUM 2 G: 2 INJECTION, POWDER, FOR SOLUTION INTRAMUSCULAR; INTRAVENOUS at 15:04

## 2022-09-07 RX ADMIN — ONDANSETRON 4 MG: 2 INJECTION INTRAMUSCULAR; INTRAVENOUS at 12:50

## 2022-09-07 ASSESSMENT — PAIN DESCRIPTION - PAIN TYPE: TYPE: ACUTE PAIN

## 2022-09-07 ASSESSMENT — FIBROSIS 4 INDEX: FIB4 SCORE: 0.31

## 2022-09-07 NOTE — ED TRIAGE NOTES
Ainsley Baker  36 y.o. female  Chief Complaint   Patient presents with    Flank Pain     Right sided flank pain and lower generalized abdominal pain.    Painful Urination     Pt was recently seen at [facility] for UTI like symptoms. Pt finished a course of abx, but symptoms have since returned.        Vitals:    09/07/22 1052   BP: 120/71   Pulse: 95   Resp: 16   Temp: 36.6 °C (97.8 °F)   SpO2: 97%       Patient educated on triage process and encouraged to alert staff of any changes in condition.    Pt ambulatory to triage for the above complaint. Pt finished dose of abx for UTI last week.     Protocol ordered

## 2022-09-07 NOTE — ED NOTES
Ambulated to room.  Agree with triage assessment.  Chart placed for ERP eval.    Pt states that pain has gotten worse today.

## 2022-09-08 NOTE — DISCHARGE INSTRUCTIONS
Please follow up with gynecology for your ovarian cyst.  I sent in a prescription for antibiotics to your pharmacy as well as Pyridium which will help your discomfort.  Take these as directed.  If your symptoms persist please follow-up with urology, I gave you the contact information above.  If you develop any new or worsening symptoms return immediately to the ER.  Try Tylenol and Motrin for your discomfort.  I hope you feel better soon!

## 2022-09-10 LAB
BACTERIA UR CULT: NORMAL
SIGNIFICANT IND 70042: NORMAL
SITE SITE: NORMAL
SOURCE SOURCE: NORMAL

## 2023-05-15 ENCOUNTER — HOSPITAL ENCOUNTER (OUTPATIENT)
Facility: MEDICAL CENTER | Age: 38
End: 2023-05-15
Attending: STUDENT IN AN ORGANIZED HEALTH CARE EDUCATION/TRAINING PROGRAM
Payer: COMMERCIAL

## 2023-05-15 ENCOUNTER — GYNECOLOGY VISIT (OUTPATIENT)
Dept: OBGYN | Facility: CLINIC | Age: 38
End: 2023-05-15
Payer: COMMERCIAL

## 2023-05-15 VITALS — SYSTOLIC BLOOD PRESSURE: 128 MMHG | WEIGHT: 187 LBS | DIASTOLIC BLOOD PRESSURE: 68 MMHG | BODY MASS INDEX: 34.2 KG/M2

## 2023-05-15 DIAGNOSIS — Z12.4 SCREENING FOR MALIGNANT NEOPLASM OF CERVIX: ICD-10-CM

## 2023-05-15 DIAGNOSIS — N83.201 CYST OF RIGHT OVARY: ICD-10-CM

## 2023-05-15 DIAGNOSIS — Z30.432 ENCOUNTER FOR IUD REMOVAL: Primary | ICD-10-CM

## 2023-05-15 DIAGNOSIS — Z30.8 ENCOUNTER FOR TUBAL LIGATION COUNSELING: ICD-10-CM

## 2023-05-15 PROCEDURE — 3078F DIAST BP <80 MM HG: CPT | Performed by: STUDENT IN AN ORGANIZED HEALTH CARE EDUCATION/TRAINING PROGRAM

## 2023-05-15 PROCEDURE — 58301 REMOVE INTRAUTERINE DEVICE: CPT | Performed by: STUDENT IN AN ORGANIZED HEALTH CARE EDUCATION/TRAINING PROGRAM

## 2023-05-15 PROCEDURE — 87591 N.GONORRHOEAE DNA AMP PROB: CPT

## 2023-05-15 PROCEDURE — 3074F SYST BP LT 130 MM HG: CPT | Performed by: STUDENT IN AN ORGANIZED HEALTH CARE EDUCATION/TRAINING PROGRAM

## 2023-05-15 PROCEDURE — 87624 HPV HI-RISK TYP POOLED RSLT: CPT

## 2023-05-15 PROCEDURE — 87491 CHLMYD TRACH DNA AMP PROBE: CPT

## 2023-05-15 PROCEDURE — 99204 OFFICE O/P NEW MOD 45 MIN: CPT | Mod: 25 | Performed by: STUDENT IN AN ORGANIZED HEALTH CARE EDUCATION/TRAINING PROGRAM

## 2023-05-15 PROCEDURE — 88175 CYTOPATH C/V AUTO FLUID REDO: CPT

## 2023-05-15 RX ORDER — PHENTERMINE HYDROCHLORIDE 15 MG/1
15 CAPSULE ORAL EVERY MORNING
COMMUNITY
End: 2023-09-11

## 2023-05-15 RX ORDER — NORETHINDRONE ACETATE AND ETHINYL ESTRADIOL 1MG-20(21)
1 KIT ORAL DAILY
Qty: 28 TABLET | Refills: 3 | Status: SHIPPED | OUTPATIENT
Start: 2023-05-15

## 2023-05-15 ASSESSMENT — FIBROSIS 4 INDEX: FIB4 SCORE: .4567734398020992367

## 2023-05-15 NOTE — PROGRESS NOTES
New GYN Problem Note    CC:   Gynecologic Exam (New Pt Mirena removal and BTL consult)      HPI:   Patient is a 37 y.o.  using Mirena IUD for contraception who presents complaining of nausea and pain from her Mirena IUD.  Has had in place for about 3.5-4 years.  Also reports 40lb weight gain that she attributes to Mirena.  She desires removal today.  Also desires bilateral salpingectomy for contraception.  She does not want to wait until her surgery to have her IUD removed.      Also reports intermittent lower pelvic pain.  Had ultrasound in Sep 2022 notable for right-sided 4.8cm simple ovarian cyst.       GYNECOLOGIC HISTORY:   Current Sexual Activity: Yes  History of sexually transmitted diseases? No  Abnormal discharge? No     Menstrual History:  Does not usually have periods with the Mirena in place.    Clots or heavy flow: No  Intermenstrual bleeding/spotting: No  Sexual problems: No  Significant pelvic pain: No  Bothersome PMS: No  Bothersome menopausal symptoms: No     Contraception  Mirena IUD, desires bilateral salpingectomy     Pap History  Last Pap: due today  Hx Moderate or Severe Dysplasia : No     Sexually active:    Social History     Substance and Sexual Activity   Sexual Activity Yes    Birth control/protection: I.U.D.       OBSTETRIC HISTORY:  OB History    Para Term  AB Living   4 4 4     4   SAB IAB Ectopic Molar Multiple Live Births             4      # Outcome Date GA Lbr Terell/2nd Weight Sex Delivery Anes PTL Lv   4 Term  40w0d   M Vag-Spont   CHRISTEN   3 Term  40w0d   F Vag-Spont   CHRISTEN   2 Term  40w0d   F Vag-Spont   CHRISTEN   1 Term  38w0d   F Vag-Spont  N CHRISTEN       MEDICAL HISTORY:  History reviewed. No pertinent past medical history.    SURGICAL HISTORY:  Past Surgical History:   Procedure Laterality Date    ABDOMINOPLASTY  2011    INGUINAL HERNIA REPAIR CHILD         FAMILY HISTORY:  Family History   Problem Relation Age of Onset    No Known Problems Mother      No Known Problems Father        ALLERGIES / REACTIONS:  No Known Allergies    SOCIAL HISTORY:   reports that she has never smoked. She has never used smokeless tobacco. She reports current alcohol use. She reports that she does not use drugs.    ROS:   Positive ROS: none  Gen: no fevers or chills, no significant weight loss or gain, excessive fatigue  Respiratory:  no cough or dyspnea  Cardiac:  no chest pain, no palpitations, no syncope  Breast: no breast discharge, pain, lump or skin changes  GI:  no heartburn, no abdominal pain, no nausea or vomiting  Urinary: no dysuria, urgency, frequency, incontinence   Psych: no depression or anxiety  Neuro: no migraines with aura, fainting spells, numbness or tingling  Extremities: no joint pain, persistently swollen ankles, recurrent leg cramps       PHYSICAL EXAMINATION:  Vital Signs:   Vitals:    05/15/23 1059   BP: 128/68   BP Location: Right arm   Weight: 187 lb     Body mass index is 34.2 kg/m².  Constitutional: The patient is well developed and well nourished.  Psychiatric: Patient is oriented to time place and person.   Skin: No rash observed.  Neck: Neck appears symmetric.  Respiratory: normal effort  Abdomen: Soft, non-tender.  Pelvic Exam:     External female genitalia: normal appearance    Urethra - no lesions, no erythema    Vagina: moist, pink, normal ruggae    Cervix: pink, smooth, no lesions, no CMT; IUD strings visualized; see procedure note    Uterus - non-tender, normal size, shape, contour, mobile, anteverted    Ovaries: non-tender, no appreciable masses  Pap obtained  Extremeties: Legs are symmetric and without tenderness. There is no edema present.    ASSESSMENT AND PLAN:  37 y.o.       1. Cyst of right ovary  -- repeat ultrasound ordered to assess for resolution    - US-PELVIC COMPLETE (TRANSABDOMINAL/TRANSVAGINAL) (COMBO); Future    2. Encounter for IUD removal  -- IUD removed today; offered/recommended to remove at time of surgery but patient  desired removal today.  -- successfully removed  -- patient would like to bridge with OCPs until her surgery; prescribed today    - Consent for all Surgical, Special Diagnostic or Therapeutic Procedures    3. Screening for malignant neoplasm of cervix  -- pap obtained at time of IUD removal  - THINPREP PAP W/HPV AND CTNG; Future    4. Encounter for tubal ligation counseling:    Surgery indications: desires permanent sterilization   - reviewed process of complete removal of tubes   - reviewed permanent/irreversible procedure   - reviewed decreased risk of ovarian cancer   - reviewed no impact in ovary function and/or menses with tubal but subjective changes are noted by patients once they are off other forms of contraception   - reviewed consideration for future contraceptive/hormonal use for non-contraceptive purposes such as menstrual control    Surgeries planned: Laparoscopic bilateral salpingectomy   - reviewed surgical procedure, anesthesia, postop pain and recovery course   - discussed the importance of patient stopping phentermine at least 2 weeks prior to surgery      Total Time: 45 minutes spent in chart review, exam, counseling and documentation  Ariel Watkins D.O.

## 2023-05-15 NOTE — PROCEDURES
IUD Removal Procedure Note    Reason for removal: headaches, nausea, weight gain  The speculum was placed.  The IUD strings were visible.  The strings were grasped and gentle traction was used to remove the device.  The procedure was completed successfully.  Type of IUD removed: Conchita Watkins D.O.  5/15/2023

## 2023-05-15 NOTE — PROGRESS NOTES
Pt here for new pt appt  Pt states she would like her IUD out and that she is experiencing abdominal pain due to a cyst on her ovary  Pharmacy verified  Good #: [phone] (home)    LMP: 05/15/23  PAP: 3 years ago and normal  BC: Mirena  STD Testing: on pap

## 2023-05-16 LAB
C TRACH DNA GENITAL QL NAA+PROBE: NEGATIVE
CYTOLOGY REG CYTOL: NORMAL
HPV HR 12 DNA CVX QL NAA+PROBE: NEGATIVE
HPV16 DNA SPEC QL NAA+PROBE: NEGATIVE
HPV18 DNA SPEC QL NAA+PROBE: NEGATIVE
N GONORRHOEA DNA GENITAL QL NAA+PROBE: NEGATIVE
SPECIMEN SOURCE: NORMAL
SPECIMEN SOURCE: NORMAL

## 2023-06-12 ENCOUNTER — HOSPITAL ENCOUNTER (OUTPATIENT)
Dept: RADIOLOGY | Facility: MEDICAL CENTER | Age: 38
End: 2023-06-12
Attending: STUDENT IN AN ORGANIZED HEALTH CARE EDUCATION/TRAINING PROGRAM
Payer: COMMERCIAL

## 2023-06-12 DIAGNOSIS — N83.201 CYST OF RIGHT OVARY: ICD-10-CM

## 2023-06-12 PROCEDURE — 76830 TRANSVAGINAL US NON-OB: CPT

## 2023-06-21 ENCOUNTER — GYNECOLOGY VISIT (OUTPATIENT)
Dept: OBGYN | Facility: CLINIC | Age: 38
End: 2023-06-21
Payer: COMMERCIAL

## 2023-06-21 VITALS — WEIGHT: 188.2 LBS | DIASTOLIC BLOOD PRESSURE: 76 MMHG | SYSTOLIC BLOOD PRESSURE: 128 MMHG | BODY MASS INDEX: 34.42 KG/M2

## 2023-06-21 DIAGNOSIS — Z01.818 PREOPERATIVE EXAM FOR GYNECOLOGIC SURGERY: ICD-10-CM

## 2023-06-21 DIAGNOSIS — G89.18 ACUTE POSTOPERATIVE PAIN: ICD-10-CM

## 2023-06-21 DIAGNOSIS — Z30.09 CONSULTATION FOR FEMALE STERILIZATION: ICD-10-CM

## 2023-06-21 PROCEDURE — 3074F SYST BP LT 130 MM HG: CPT | Performed by: STUDENT IN AN ORGANIZED HEALTH CARE EDUCATION/TRAINING PROGRAM

## 2023-06-21 PROCEDURE — 3078F DIAST BP <80 MM HG: CPT | Performed by: STUDENT IN AN ORGANIZED HEALTH CARE EDUCATION/TRAINING PROGRAM

## 2023-06-21 PROCEDURE — 99999 PR NO CHARGE: CPT | Performed by: STUDENT IN AN ORGANIZED HEALTH CARE EDUCATION/TRAINING PROGRAM

## 2023-06-21 RX ORDER — ONDANSETRON 4 MG/1
4 TABLET, FILM COATED ORAL EVERY 4 HOURS PRN
Qty: 12 TABLET | Refills: 0 | Status: SHIPPED | OUTPATIENT
Start: 2023-06-21 | End: 2023-06-23

## 2023-06-21 RX ORDER — ACETAMINOPHEN 500 MG
500-1000 TABLET ORAL EVERY 6 HOURS PRN
Qty: 30 TABLET | Refills: 0 | Status: SHIPPED | OUTPATIENT
Start: 2023-06-21 | End: 2023-08-18

## 2023-06-21 RX ORDER — ONDANSETRON 4 MG/1
4 TABLET, ORALLY DISINTEGRATING ORAL EVERY 6 HOURS PRN
Qty: 10 TABLET | Refills: 0 | Status: SHIPPED | OUTPATIENT
Start: 2023-06-21 | End: 2023-06-21 | Stop reason: CLARIF

## 2023-06-21 RX ORDER — IBUPROFEN 800 MG/1
800 TABLET ORAL EVERY 8 HOURS PRN
Qty: 30 TABLET | Refills: 0 | Status: SHIPPED | OUTPATIENT
Start: 2023-06-21 | End: 2023-08-18

## 2023-06-21 RX ORDER — OXYCODONE HYDROCHLORIDE 5 MG/1
5 TABLET ORAL EVERY 6 HOURS PRN
Qty: 8 TABLET | Refills: 0 | Status: SHIPPED | OUTPATIENT
Start: 2023-06-21 | End: 2023-06-23

## 2023-06-21 ASSESSMENT — FIBROSIS 4 INDEX: FIB4 SCORE: .4567734398020992367

## 2023-06-21 NOTE — PROGRESS NOTES
Patient is here for PRE-OP. She is having BTL surgery on 7/12/23. She has not had an LMP due to she had the Mirena in. She got the Mirena tooken out last month.

## 2023-06-21 NOTE — PROGRESS NOTES
GYNECOLOGY PRE-OPERATIVE HISTORY AND PHYSICAL    CC: Pre-op for 2023      HPI: Patient is a 37 y.o.  who presents for her pre-operative history and physical. She is scheduled to undergo laparoscopic bilateral salpingectomy for desired sterilization on 2023.  She denies any changes to her medical or surgical history.  She has stopped taking phentermine x 1 month.  She is taking terbinafine daily for foot fungus but no other medication changes.  Cramping has improved since her Mirena was removed.  She reports unprotected intercourse day before yesterday.  Has not had a period since Mirena was removed last month.       GYNECOLOGIC HISTORY:  Current contraceptive: none  Last Pap: 05/15/2023      OBSTETRIC HISTORY:  OB History    Para Term  AB Living   4 4 4     4   SAB IAB Ectopic Molar Multiple Live Births             4      # Outcome Date GA Lbr Terell/2nd Weight Sex Delivery Anes PTL Lv   4 Term  40w0d   M Vag-Spont   CHRISTEN   3 Term  40w0d   F Vag-Spont   CHRISTEN   2 Term  40w0d   F Vag-Spont   CHRISTEN   1 Term  38w0d   F Vag-Spont  N CHRISTEN       MEDICAL HISTORY:  No past medical history on file.    MEDICATIONS:  Current Outpatient Medications on File Prior to Visit   Medication Sig Dispense Refill    phentermine 15 MG capsule Take 15 mg by mouth every morning.      norethindrone-ethinyl estradiol (LOESTRIN FE ) 1-20 MG-MCG per tablet Take 1 Tablet by mouth every day. 28 Tablet 3    phenazopyridine (PYRIDIUM) 200 MG Tab Take 1 Tablet by mouth 3 times a day as needed for Mild Pain. (Patient not taking: Reported on 5/15/2023) 6 Tablet 0    ibuprofen (MOTRIN) 600 MG Tab Take 1 Tab by mouth every 6 hours as needed (For cramping after delivery; do not give if patient is receiving ketorolac (Toradol)). (Patient not taking: Reported on 5/15/2023) 30 Tab 0     No current facility-administered medications on file prior to visit.       ALLERGIES / REACTIONS:  No Known Allergies    FAMILY  HISTORY:  Family History   Problem Relation Age of Onset    No Known Problems Mother     No Known Problems Father        SURGICAL HISTORY:  Past Surgical History:   Procedure Laterality Date    ABDOMINOPLASTY  2011    INGUINAL HERNIA REPAIR CHILD         SOCIAL HISTORY:   reports that she has never smoked. She has never used smokeless tobacco. She reports current alcohol use. She reports that she does not use drugs.  Social History     Socioeconomic History    Marital status: Single     Spouse name: Not on file    Number of children: Not on file    Years of education: Not on file    Highest education level: Not on file   Occupational History    Not on file   Tobacco Use    Smoking status: Never    Smokeless tobacco: Never   Vaping Use    Vaping Use: Never used   Substance and Sexual Activity    Alcohol use: Yes     Comment: occ    Drug use: No    Sexual activity: Yes     Birth control/protection: I.U.D.   Other Topics Concern    Not on file   Social History Narrative    Not on file     Social Determinants of Health     Financial Resource Strain: Not on file   Food Insecurity: Not on file   Transportation Needs: Not on file   Physical Activity: Not on file   Stress: Not on file   Social Connections: Not on file   Intimate Partner Violence: Not on file   Housing Stability: Not on file         ROS:  General: Fever: no  HEENT: Sore Throat: no  CV: Chest Pain: no  Repiratory: Shortness of Breath: no  GI: Abdominal pain: no  : Dysuria: no    PHYSICAL EXAMINATION:  Vital Signs:   Vitals:    06/21/23 0857   BP: 128/76   BP Location: Right arm   Patient Position: Sitting   BP Cuff Size: Adult   Weight: 188 lb 3.2 oz     Appearance/Psychiatric: She does not appear anxious.  Constitutional: The patient is well nourished.  Neck: Neck appears symmetric.  Heart: regular rate and rhythm  Lungs:clear to auscultation, Respirations unlabored, and no use of accessory muscles of inspiration noted  Abd: Soft, flat and non-tender and  No masses or organomegaly  Extremeties: Legs are symmetric and without tenderness.  Skin: No rash observed.      LABS / IMAGING:  No new labs or imaging      ASSESSMENT AND PLAN:  37 y.o.      1. Preoperative exam for gynecologic surgery        2. Consultation for female sterilization            Surgery indications: desires permanent sterilization    Surgeries planned: laparoscopic bilateral salpingectomy   - reviewed surgical procedure, general anesthesia and plan for outpatient procedure   - reviewed postop pain control with alternating tylenol/motrin. Narcotics for breakthrough pain first 1-2 days.   - reviewed recovery process at home for POD 0/1 and the first two weeks until postop visit. Reviewed activities and restrictions   - reviewed restrictions including no heavy lifting, nothing in the vagina ,and no tub baths for 2-4 weeks.      - postop meds prescribed today   - all questions answered to patient's satisfaction.     Total Time: 28 minutes spent in chart review, exam, counseling and documentation    Ariel Watkins D.O.

## 2023-07-12 ENCOUNTER — HOSPITAL ENCOUNTER (OUTPATIENT)
Facility: MEDICAL CENTER | Age: 38
End: 2023-07-12
Attending: STUDENT IN AN ORGANIZED HEALTH CARE EDUCATION/TRAINING PROGRAM | Admitting: STUDENT IN AN ORGANIZED HEALTH CARE EDUCATION/TRAINING PROGRAM
Payer: COMMERCIAL

## 2023-07-12 RX ORDER — EPINEPHRINE 1 MG/ML(1)
AMPUL (ML) INJECTION
Status: DISCONTINUED
Start: 2023-07-12 | End: 2023-07-12 | Stop reason: HOSPADM

## 2023-07-12 RX ORDER — DIPHENHYDRAMINE HYDROCHLORIDE 50 MG/ML
12.5 INJECTION INTRAMUSCULAR; INTRAVENOUS
Status: CANCELLED | OUTPATIENT
Start: 2023-07-12

## 2023-07-12 RX ORDER — ACETAMINOPHEN 500 MG
1000 TABLET ORAL ONCE
Status: CANCELLED | OUTPATIENT
Start: 2023-07-12 | End: 2023-07-12

## 2023-07-12 RX ORDER — SODIUM CHLORIDE, SODIUM LACTATE, POTASSIUM CHLORIDE, CALCIUM CHLORIDE 600; 310; 30; 20 MG/100ML; MG/100ML; MG/100ML; MG/100ML
INJECTION, SOLUTION INTRAVENOUS CONTINUOUS
Status: CANCELLED | OUTPATIENT
Start: 2023-07-12

## 2023-07-12 RX ORDER — ONDANSETRON 2 MG/ML
4 INJECTION INTRAMUSCULAR; INTRAVENOUS
Status: CANCELLED | OUTPATIENT
Start: 2023-07-12

## 2023-07-12 RX ORDER — OXYCODONE HCL 5 MG/5 ML
5 SOLUTION, ORAL ORAL
Status: CANCELLED | OUTPATIENT
Start: 2023-07-12

## 2023-07-12 RX ORDER — BUPIVACAINE HYDROCHLORIDE 2.5 MG/ML
INJECTION, SOLUTION EPIDURAL; INFILTRATION; INTRACAUDAL
Status: DISCONTINUED
Start: 2023-07-12 | End: 2023-07-12 | Stop reason: HOSPADM

## 2023-07-12 RX ORDER — HALOPERIDOL 5 MG/ML
1 INJECTION INTRAMUSCULAR
Status: CANCELLED | OUTPATIENT
Start: 2023-07-12

## 2023-07-12 RX ORDER — OXYCODONE HCL 5 MG/5 ML
10 SOLUTION, ORAL ORAL
Status: CANCELLED | OUTPATIENT
Start: 2023-07-12

## 2023-07-14 ENCOUNTER — HOSPITAL ENCOUNTER (OUTPATIENT)
Facility: MEDICAL CENTER | Age: 38
End: 2023-07-14
Attending: STUDENT IN AN ORGANIZED HEALTH CARE EDUCATION/TRAINING PROGRAM | Admitting: STUDENT IN AN ORGANIZED HEALTH CARE EDUCATION/TRAINING PROGRAM
Payer: COMMERCIAL

## 2023-07-17 ENCOUNTER — OFFICE VISIT (OUTPATIENT)
Dept: MEDICAL GROUP | Facility: CLINIC | Age: 38
End: 2023-07-17
Payer: COMMERCIAL

## 2023-07-17 VITALS
WEIGHT: 187.6 LBS | DIASTOLIC BLOOD PRESSURE: 73 MMHG | OXYGEN SATURATION: 97 % | BODY MASS INDEX: 34.52 KG/M2 | TEMPERATURE: 97.9 F | HEIGHT: 62 IN | HEART RATE: 64 BPM | SYSTOLIC BLOOD PRESSURE: 125 MMHG

## 2023-07-17 DIAGNOSIS — G44.209 TENSION HEADACHE: ICD-10-CM

## 2023-07-17 DIAGNOSIS — I83.813 VARICOSE VEINS OF BOTH LOWER EXTREMITIES WITH PAIN: ICD-10-CM

## 2023-07-17 DIAGNOSIS — Z83.438 FAMILY HISTORY OF DYSLIPIDEMIA: ICD-10-CM

## 2023-07-17 DIAGNOSIS — R25.2 LEG CRAMPING: ICD-10-CM

## 2023-07-17 DIAGNOSIS — G47.30 SLEEP APNEA, UNSPECIFIED TYPE: ICD-10-CM

## 2023-07-17 DIAGNOSIS — Z00.00 HEALTHCARE MAINTENANCE: ICD-10-CM

## 2023-07-17 PROCEDURE — 3074F SYST BP LT 130 MM HG: CPT | Mod: GC

## 2023-07-17 PROCEDURE — 99204 OFFICE O/P NEW MOD 45 MIN: CPT | Mod: GC

## 2023-07-17 PROCEDURE — 3078F DIAST BP <80 MM HG: CPT | Mod: GC

## 2023-07-17 RX ORDER — TERBINAFINE HYDROCHLORIDE 250 MG/1
TABLET ORAL
COMMUNITY
Start: 2023-07-15 | End: 2023-09-11

## 2023-07-17 ASSESSMENT — FIBROSIS 4 INDEX: FIB4 SCORE: .4567734398020992367

## 2023-07-17 NOTE — PROGRESS NOTES
Subjective:     CC: to establish care     HPI:   Yi presents today to establish care. Patient has multiple complaints. She states that she has a headache constantly. Headache is located on bilateral forehead. Occurs mostly at night while she is falling asleep and in the morning when she wakes up. She is a  and is constantly working her upper arms and back. She has not tried any medications for her headache. No vision changes. No history of migraines. She states she does not eat well while at work for 9 or 10 hours but drinks plenty of water. She denies working with fumes or dyes at the [employer]. She states her sleep is poor, she will wake up throughout the night at times gasping. Thinks that she snores.     Patient is also concerned about her varicose veins. She has had them for years but appears to be worsening. She was previously seen at a vein clinic who attempted to remove varicose veins but difficult to complete due to insurance issues. She feels like her veins are more painful. She stands on her feet for 9 to 10 hours and feels like her legs get more easily fatigued.     Patient also wanting lab work done stating she has never had labs before. She has a family history of hypertension as well as dyslipidemia. No diabetes.     Current Outpatient Medications Ordered in Epic   Medication Sig Dispense Refill    ibuprofen (MOTRIN) 800 MG Tab Take 1 Tablet by mouth every 8 hours as needed for Mild Pain. 30 Tablet 0    acetaminophen (TYLENOL) 500 MG Tab Take 1-2 Tablets by mouth every 6 hours as needed for Mild Pain. 30 Tablet 0    phentermine 15 MG capsule Take 15 mg by mouth every morning.      norethindrone-ethinyl estradiol (LOESTRIN FE 1/20) 1-20 MG-MCG per tablet Take 1 Tablet by mouth every day. 28 Tablet 3    phenazopyridine (PYRIDIUM) 200 MG Tab Take 1 Tablet by mouth 3 times a day as needed for Mild Pain. (Patient not taking: Reported on 5/15/2023) 6 Tablet 0    ibuprofen (MOTRIN) 600 MG Tab  "Take 1 Tab by mouth every 6 hours as needed (For cramping after delivery; do not give if patient is receiving ketorolac (Toradol)). (Patient not taking: Reported on 5/15/2023) 30 Tab 0     No current Epic-ordered facility-administered medications on file.     ROS:  Gen: no fevers/chills, no changes in weight  Pulm: no sob, no cough  CV: no chest pain, no palpitations  GI: no nausea/vomiting, no diarrhea    Objective:     Exam:  /73 (BP Location: Left arm, Patient Position: Sitting, BP Cuff Size: Large adult)   Pulse 64   Temp 36.6 °C (97.9 °F) (Temporal)   Ht 1.575 m (5' 2\")   Wt 85.1 kg (187 lb 9.6 oz)   LMP  (LMP Unknown)   SpO2 97%   BMI 34.31 kg/m²  Body mass index is 34.31 kg/m².    Gen: Alert and oriented, No apparent distress.  Neck: Neck is supple without lymphadenopathy.  Lungs: Normal effort, CTA bilaterally, no wheezes, rhonchi, or rales  CV: Regular rate and rhythm. No murmurs, rubs, or gallops.  Ext: No clubbing, cyanosis, edema.  MSK: tenderness along the SCM bilaterally.     Labs: None     Assessment & Plan:     37 y.o. female with the following -     1. BMI 34.0-34.9,adult  Patient has not been previously had lab work completed. She would like general labs done at this time. Has family history of dyslipidemia. Also ordered CMP and CBC.  - Lipid Profile; Future  - HEMOGLOBIN A1C; Future    2. Varicose veins of both lower extremities with pain  Patient has long standing history of varicose veins. She states she was previously seen by a vein clinic however her insurance would not cover for a procedure. She now feels like she has pain with her varicose veins. She states her legs are very painful after work. She is on her feet all day and will attempt to exercise but stops due to pain. We reviewed stretches as pain may be due to muscular pain. Pt would like to continue with vascular medicine.   - Referral to Vascular Medicine    3. Sleep apnea, unspecified type  Patient complaining of fatigue " and morning headache. Pt states she may snore and also wakes up gasping in the middle of the night.   - Referral to Pulmonary and Sleep Medicine     4. Tension headache   Has chronic bilateral forehead pain worse at night. On exam she has point tenderness along the Sternocleidomastoid muscle. Discussed taking motrin, ice, heat, and stretching to help headaches.     Return if symptoms worsen or fail to improve.    Belinda Lacey M.D.  PGY1

## 2023-07-19 LAB — HBA1C MFR BLD: 5.1 % (ref ?–5.8)

## 2023-07-25 ENCOUNTER — TELEPHONE (OUTPATIENT)
Dept: CARDIOLOGY | Facility: MEDICAL CENTER | Age: 38
End: 2023-07-25
Payer: COMMERCIAL

## 2023-07-27 ENCOUNTER — APPOINTMENT (OUTPATIENT)
Dept: ADMISSIONS | Facility: MEDICAL CENTER | Age: 38
End: 2023-07-27
Attending: STUDENT IN AN ORGANIZED HEALTH CARE EDUCATION/TRAINING PROGRAM
Payer: COMMERCIAL

## 2023-08-08 ENCOUNTER — TELEPHONE (OUTPATIENT)
Dept: CARDIOLOGY | Facility: MEDICAL CENTER | Age: 38
End: 2023-08-08
Payer: COMMERCIAL

## 2023-08-08 ENCOUNTER — APPOINTMENT (OUTPATIENT)
Dept: ADMISSIONS | Facility: MEDICAL CENTER | Age: 38
End: 2023-08-08
Attending: STUDENT IN AN ORGANIZED HEALTH CARE EDUCATION/TRAINING PROGRAM
Payer: COMMERCIAL

## 2023-08-08 NOTE — TELEPHONE ENCOUNTER
[facility] Unable to LVM. Sent MyChart. /cg 08/08/23

## 2023-08-18 ENCOUNTER — PRE-ADMISSION TESTING (OUTPATIENT)
Dept: ADMISSIONS | Facility: MEDICAL CENTER | Age: 38
End: 2023-08-18
Attending: STUDENT IN AN ORGANIZED HEALTH CARE EDUCATION/TRAINING PROGRAM
Payer: COMMERCIAL

## 2023-08-21 ENCOUNTER — ANESTHESIA EVENT (OUTPATIENT)
Dept: SURGERY | Facility: MEDICAL CENTER | Age: 38
End: 2023-08-21
Payer: COMMERCIAL

## 2023-08-21 ENCOUNTER — ANESTHESIA (OUTPATIENT)
Dept: SURGERY | Facility: MEDICAL CENTER | Age: 38
End: 2023-08-21
Payer: COMMERCIAL

## 2023-08-21 ENCOUNTER — HOSPITAL ENCOUNTER (OUTPATIENT)
Facility: MEDICAL CENTER | Age: 38
End: 2023-08-21
Attending: STUDENT IN AN ORGANIZED HEALTH CARE EDUCATION/TRAINING PROGRAM | Admitting: STUDENT IN AN ORGANIZED HEALTH CARE EDUCATION/TRAINING PROGRAM
Payer: COMMERCIAL

## 2023-08-21 VITALS
RESPIRATION RATE: 45 BRPM | OXYGEN SATURATION: 93 % | BODY MASS INDEX: 33.84 KG/M2 | SYSTOLIC BLOOD PRESSURE: 127 MMHG | HEART RATE: 57 BPM | WEIGHT: 183.86 LBS | DIASTOLIC BLOOD PRESSURE: 77 MMHG | TEMPERATURE: 97.3 F | HEIGHT: 62 IN

## 2023-08-21 PROBLEM — Z30.2 ENCOUNTER FOR TUBAL LIGATION: Status: ACTIVE | Noted: 2023-08-21

## 2023-08-21 LAB
HCG UR QL: NEGATIVE
PATHOLOGY CONSULT NOTE: NORMAL

## 2023-08-21 PROCEDURE — 88302 TISSUE EXAM BY PATHOLOGIST: CPT

## 2023-08-21 PROCEDURE — 700102 HCHG RX REV CODE 250 W/ 637 OVERRIDE(OP): Mod: UD | Performed by: ANESTHESIOLOGY

## 2023-08-21 PROCEDURE — 700101 HCHG RX REV CODE 250: Mod: UD | Performed by: ANESTHESIOLOGY

## 2023-08-21 PROCEDURE — 700111 HCHG RX REV CODE 636 W/ 250 OVERRIDE (IP): Mod: UD | Performed by: ANESTHESIOLOGY

## 2023-08-21 PROCEDURE — 160035 HCHG PACU - 1ST 60 MINS PHASE I: Performed by: STUDENT IN AN ORGANIZED HEALTH CARE EDUCATION/TRAINING PROGRAM

## 2023-08-21 PROCEDURE — A9270 NON-COVERED ITEM OR SERVICE: HCPCS | Mod: UD | Performed by: ANESTHESIOLOGY

## 2023-08-21 PROCEDURE — 160009 HCHG ANES TIME/MIN: Performed by: STUDENT IN AN ORGANIZED HEALTH CARE EDUCATION/TRAINING PROGRAM

## 2023-08-21 PROCEDURE — 160047 HCHG PACU  - EA ADDL 30 MINS PHASE II: Performed by: STUDENT IN AN ORGANIZED HEALTH CARE EDUCATION/TRAINING PROGRAM

## 2023-08-21 PROCEDURE — 700105 HCHG RX REV CODE 258: Mod: JZ,UD | Performed by: ANESTHESIOLOGY

## 2023-08-21 PROCEDURE — 58661 LAPAROSCOPY REMOVE ADNEXA: CPT | Mod: 80 | Performed by: OBSTETRICS & GYNECOLOGY

## 2023-08-21 PROCEDURE — 81025 URINE PREGNANCY TEST: CPT

## 2023-08-21 PROCEDURE — 160029 HCHG SURGERY MINUTES - 1ST 30 MINS LEVEL 4: Performed by: STUDENT IN AN ORGANIZED HEALTH CARE EDUCATION/TRAINING PROGRAM

## 2023-08-21 PROCEDURE — 160041 HCHG SURGERY MINUTES - EA ADDL 1 MIN LEVEL 4: Performed by: STUDENT IN AN ORGANIZED HEALTH CARE EDUCATION/TRAINING PROGRAM

## 2023-08-21 PROCEDURE — 160002 HCHG RECOVERY MINUTES (STAT): Performed by: STUDENT IN AN ORGANIZED HEALTH CARE EDUCATION/TRAINING PROGRAM

## 2023-08-21 PROCEDURE — 160048 HCHG OR STATISTICAL LEVEL 1-5: Performed by: STUDENT IN AN ORGANIZED HEALTH CARE EDUCATION/TRAINING PROGRAM

## 2023-08-21 PROCEDURE — 160046 HCHG PACU - 1ST 60 MINS PHASE II: Performed by: STUDENT IN AN ORGANIZED HEALTH CARE EDUCATION/TRAINING PROGRAM

## 2023-08-21 PROCEDURE — 58661 LAPAROSCOPY REMOVE ADNEXA: CPT | Performed by: STUDENT IN AN ORGANIZED HEALTH CARE EDUCATION/TRAINING PROGRAM

## 2023-08-21 PROCEDURE — 160025 RECOVERY II MINUTES (STATS): Performed by: STUDENT IN AN ORGANIZED HEALTH CARE EDUCATION/TRAINING PROGRAM

## 2023-08-21 PROCEDURE — 700101 HCHG RX REV CODE 250: Mod: UD | Performed by: STUDENT IN AN ORGANIZED HEALTH CARE EDUCATION/TRAINING PROGRAM

## 2023-08-21 RX ORDER — DEXAMETHASONE SODIUM PHOSPHATE 4 MG/ML
INJECTION, SOLUTION INTRA-ARTICULAR; INTRALESIONAL; INTRAMUSCULAR; INTRAVENOUS; SOFT TISSUE PRN
Status: DISCONTINUED | OUTPATIENT
Start: 2023-08-21 | End: 2023-08-21 | Stop reason: SURG

## 2023-08-21 RX ORDER — HALOPERIDOL 5 MG/ML
1 INJECTION INTRAMUSCULAR
Status: DISCONTINUED | OUTPATIENT
Start: 2023-08-21 | End: 2023-08-21 | Stop reason: HOSPADM

## 2023-08-21 RX ORDER — ONDANSETRON 2 MG/ML
4 INJECTION INTRAMUSCULAR; INTRAVENOUS
Status: DISCONTINUED | OUTPATIENT
Start: 2023-08-21 | End: 2023-08-21 | Stop reason: HOSPADM

## 2023-08-21 RX ORDER — HYDROMORPHONE HYDROCHLORIDE 1 MG/ML
0.1 INJECTION, SOLUTION INTRAMUSCULAR; INTRAVENOUS; SUBCUTANEOUS
Status: DISCONTINUED | OUTPATIENT
Start: 2023-08-21 | End: 2023-08-21 | Stop reason: HOSPADM

## 2023-08-21 RX ORDER — CEFAZOLIN SODIUM 1 G/3ML
INJECTION, POWDER, FOR SOLUTION INTRAMUSCULAR; INTRAVENOUS PRN
Status: DISCONTINUED | OUTPATIENT
Start: 2023-08-21 | End: 2023-08-21 | Stop reason: SURG

## 2023-08-21 RX ORDER — DIPHENHYDRAMINE HYDROCHLORIDE 50 MG/ML
12.5 INJECTION INTRAMUSCULAR; INTRAVENOUS
Status: DISCONTINUED | OUTPATIENT
Start: 2023-08-21 | End: 2023-08-21 | Stop reason: HOSPADM

## 2023-08-21 RX ORDER — LIDOCAINE HYDROCHLORIDE 40 MG/ML
SOLUTION TOPICAL PRN
Status: DISCONTINUED | OUTPATIENT
Start: 2023-08-21 | End: 2023-08-21 | Stop reason: SURG

## 2023-08-21 RX ORDER — HYDROMORPHONE HYDROCHLORIDE 1 MG/ML
0.2 INJECTION, SOLUTION INTRAMUSCULAR; INTRAVENOUS; SUBCUTANEOUS
Status: DISCONTINUED | OUTPATIENT
Start: 2023-08-21 | End: 2023-08-21 | Stop reason: HOSPADM

## 2023-08-21 RX ORDER — SODIUM CHLORIDE, SODIUM LACTATE, POTASSIUM CHLORIDE, CALCIUM CHLORIDE 600; 310; 30; 20 MG/100ML; MG/100ML; MG/100ML; MG/100ML
INJECTION, SOLUTION INTRAVENOUS CONTINUOUS
Status: DISCONTINUED | OUTPATIENT
Start: 2023-08-21 | End: 2023-08-21 | Stop reason: HOSPADM

## 2023-08-21 RX ORDER — MIDAZOLAM HYDROCHLORIDE 1 MG/ML
INJECTION INTRAMUSCULAR; INTRAVENOUS PRN
Status: DISCONTINUED | OUTPATIENT
Start: 2023-08-21 | End: 2023-08-21 | Stop reason: SURG

## 2023-08-21 RX ORDER — LIDOCAINE HYDROCHLORIDE 20 MG/ML
INJECTION, SOLUTION EPIDURAL; INFILTRATION; INTRACAUDAL; PERINEURAL PRN
Status: DISCONTINUED | OUTPATIENT
Start: 2023-08-21 | End: 2023-08-21 | Stop reason: SURG

## 2023-08-21 RX ORDER — SODIUM CHLORIDE, SODIUM LACTATE, POTASSIUM CHLORIDE, CALCIUM CHLORIDE 600; 310; 30; 20 MG/100ML; MG/100ML; MG/100ML; MG/100ML
INJECTION, SOLUTION INTRAVENOUS
Status: DISCONTINUED | OUTPATIENT
Start: 2023-08-21 | End: 2023-08-21 | Stop reason: SURG

## 2023-08-21 RX ORDER — MEPERIDINE HYDROCHLORIDE 25 MG/ML
12.5 INJECTION INTRAMUSCULAR; INTRAVENOUS; SUBCUTANEOUS
Status: DISCONTINUED | OUTPATIENT
Start: 2023-08-21 | End: 2023-08-21 | Stop reason: HOSPADM

## 2023-08-21 RX ORDER — HYDROMORPHONE HYDROCHLORIDE 1 MG/ML
0.4 INJECTION, SOLUTION INTRAMUSCULAR; INTRAVENOUS; SUBCUTANEOUS
Status: DISCONTINUED | OUTPATIENT
Start: 2023-08-21 | End: 2023-08-21 | Stop reason: HOSPADM

## 2023-08-21 RX ORDER — HYDROMORPHONE HYDROCHLORIDE 2 MG/ML
INJECTION, SOLUTION INTRAMUSCULAR; INTRAVENOUS; SUBCUTANEOUS PRN
Status: DISCONTINUED | OUTPATIENT
Start: 2023-08-21 | End: 2023-08-21 | Stop reason: SURG

## 2023-08-21 RX ORDER — ACETAMINOPHEN 500 MG
1000 TABLET ORAL ONCE
Status: COMPLETED | OUTPATIENT
Start: 2023-08-21 | End: 2023-08-21

## 2023-08-21 RX ORDER — ROCURONIUM BROMIDE 10 MG/ML
INJECTION, SOLUTION INTRAVENOUS PRN
Status: DISCONTINUED | OUTPATIENT
Start: 2023-08-21 | End: 2023-08-21 | Stop reason: SURG

## 2023-08-21 RX ORDER — BUPIVACAINE HYDROCHLORIDE AND EPINEPHRINE 2.5; 5 MG/ML; UG/ML
INJECTION, SOLUTION EPIDURAL; INFILTRATION; INTRACAUDAL; PERINEURAL
Status: DISCONTINUED | OUTPATIENT
Start: 2023-08-21 | End: 2023-08-21 | Stop reason: HOSPADM

## 2023-08-21 RX ORDER — GABAPENTIN 300 MG/1
600 CAPSULE ORAL ONCE
Status: COMPLETED | OUTPATIENT
Start: 2023-08-21 | End: 2023-08-21

## 2023-08-21 RX ORDER — ONDANSETRON 2 MG/ML
INJECTION INTRAMUSCULAR; INTRAVENOUS PRN
Status: DISCONTINUED | OUTPATIENT
Start: 2023-08-21 | End: 2023-08-21 | Stop reason: SURG

## 2023-08-21 RX ORDER — CELECOXIB 200 MG/1
400 CAPSULE ORAL ONCE
Status: COMPLETED | OUTPATIENT
Start: 2023-08-21 | End: 2023-08-21

## 2023-08-21 RX ORDER — OXYCODONE HCL 5 MG/5 ML
5 SOLUTION, ORAL ORAL
Status: COMPLETED | OUTPATIENT
Start: 2023-08-21 | End: 2023-08-21

## 2023-08-21 RX ORDER — IPRATROPIUM BROMIDE AND ALBUTEROL SULFATE 2.5; .5 MG/3ML; MG/3ML
3 SOLUTION RESPIRATORY (INHALATION)
Status: DISCONTINUED | OUTPATIENT
Start: 2023-08-21 | End: 2023-08-21 | Stop reason: HOSPADM

## 2023-08-21 RX ORDER — BUPIVACAINE HYDROCHLORIDE 2.5 MG/ML
INJECTION, SOLUTION EPIDURAL; INFILTRATION; INTRACAUDAL
Status: DISCONTINUED
Start: 2023-08-21 | End: 2023-08-21 | Stop reason: HOSPADM

## 2023-08-21 RX ORDER — OXYCODONE HCL 5 MG/5 ML
10 SOLUTION, ORAL ORAL
Status: COMPLETED | OUTPATIENT
Start: 2023-08-21 | End: 2023-08-21

## 2023-08-21 RX ADMIN — ROCURONIUM BROMIDE 10 MG: 50 INJECTION, SOLUTION INTRAVENOUS at 15:23

## 2023-08-21 RX ADMIN — CEFAZOLIN 2 G: 1 INJECTION, POWDER, FOR SOLUTION INTRAMUSCULAR; INTRAVENOUS at 14:52

## 2023-08-21 RX ADMIN — PROPOFOL 50 MG: 10 INJECTION, EMULSION INTRAVENOUS at 15:34

## 2023-08-21 RX ADMIN — LIDOCAINE HYDROCHLORIDE 80 MG: 20 INJECTION, SOLUTION EPIDURAL; INFILTRATION; INTRACAUDAL at 14:52

## 2023-08-21 RX ADMIN — GABAPENTIN 600 MG: 300 CAPSULE ORAL at 13:58

## 2023-08-21 RX ADMIN — SODIUM CHLORIDE, POTASSIUM CHLORIDE, SODIUM LACTATE AND CALCIUM CHLORIDE: 600; 310; 30; 20 INJECTION, SOLUTION INTRAVENOUS at 14:42

## 2023-08-21 RX ADMIN — SUGAMMADEX 200 MG: 100 INJECTION, SOLUTION INTRAVENOUS at 15:34

## 2023-08-21 RX ADMIN — ACETAMINOPHEN 1000 MG: 500 TABLET, FILM COATED ORAL at 13:58

## 2023-08-21 RX ADMIN — HYDROMORPHONE HYDROCHLORIDE 0.5 MG: 2 INJECTION INTRAMUSCULAR; INTRAVENOUS; SUBCUTANEOUS at 14:45

## 2023-08-21 RX ADMIN — CELECOXIB 400 MG: 200 CAPSULE ORAL at 13:58

## 2023-08-21 RX ADMIN — LIDOCAINE HYDROCHLORIDE 4 ML: 40 SOLUTION TOPICAL at 14:53

## 2023-08-21 RX ADMIN — MIDAZOLAM 2 MG: 1 INJECTION, SOLUTION INTRAMUSCULAR; INTRAVENOUS at 14:45

## 2023-08-21 RX ADMIN — DEXAMETHASONE SODIUM PHOSPHATE 8 MG: 4 INJECTION INTRA-ARTICULAR; INTRALESIONAL; INTRAMUSCULAR; INTRAVENOUS; SOFT TISSUE at 14:52

## 2023-08-21 RX ADMIN — HYDROMORPHONE HYDROCHLORIDE 0.5 MG: 2 INJECTION INTRAMUSCULAR; INTRAVENOUS; SUBCUTANEOUS at 15:18

## 2023-08-21 RX ADMIN — ONDANSETRON 4 MG: 2 INJECTION INTRAMUSCULAR; INTRAVENOUS at 15:34

## 2023-08-21 RX ADMIN — PROPOFOL 150 MG: 10 INJECTION, EMULSION INTRAVENOUS at 14:52

## 2023-08-21 RX ADMIN — OXYCODONE HYDROCHLORIDE 10 MG: 5 SOLUTION ORAL at 16:04

## 2023-08-21 RX ADMIN — ROCURONIUM BROMIDE 50 MG: 50 INJECTION, SOLUTION INTRAVENOUS at 14:52

## 2023-08-21 ASSESSMENT — PAIN DESCRIPTION - PAIN TYPE
TYPE: SURGICAL PAIN

## 2023-08-21 ASSESSMENT — FIBROSIS 4 INDEX: FIB4 SCORE: .4567734398020992367

## 2023-08-21 ASSESSMENT — PAIN SCALES - GENERAL: PAIN_LEVEL: 2

## 2023-08-21 NOTE — ANESTHESIA PREPROCEDURE EVALUATION
Case: [id number] Date/Time: 08/21/23 1315    Procedure: LAPAROSCOPIC BILATERAL SALPINGECTOMY, PELVIC EXAM UNDER ANESTHESIA (Abdomen)    Anesthesia type: General    Pre-op diagnosis: PERMANENT STERILIZATION    Location: [facility] ROOM 27 / SURGERY SAME DAY [facility]    Surgeons: Ariel Watkins D.O.          Relevant Problems   No relevant active problems     Current Outpatient Medications   Medication Instructions   • norethindrone-ethinyl estradiol (LOESTRIN FE 1/20) 1-20 MG-MCG per tablet 1 Tablet, Oral, DAILY   • phentermine 15 mg, EVERY MORNING   • terbinafine (LAMISIL) 250 MG Tab AFTER DINNER         Physical Exam    Airway   Mallampati: II  TM distance: >3 FB  Neck ROM: full       Cardiovascular - normal exam  Rhythm: regular  Rate: normal  (-) murmur     Dental - normal exam           Pulmonary - normal exam  Breath sounds clear to auscultation     Abdominal   (+) obese     Neurological - normal exam                 Anesthesia Plan    ASA 2       Plan - general       Airway plan will be ETT          Induction: intravenous    Postoperative Plan: Postoperative administration of opioids is intended.    Pertinent diagnostic labs and testing reviewed    Informed Consent:    Anesthetic plan and risks discussed with patient.      Patient fully consented for general anesthesia. Anesthetic procedure and risks discussed in detail. Risks include but are not limited to PONV, pain, sore throat, damage to teeth/lips/gums, positioning injury, allergic reaction, vocal cord injury, and/or cardiopulmonary problems up to and including death. Patient indicates complete understanding. All patient questions answered to full satisfaction and they agree to proceed as planned.

## 2023-08-21 NOTE — H&P
GYNECOLOGY PRE-OPERATIVE HISTORY AND PHYSICAL     CC: Pre-op for 2023        HPI: Patient is a 37 y.o.  who presents for her scheduled laparoscopic bilateral salpingectomy for desired sterilization on 2023.  She denies any changes to her medical or surgical history.  She has stopped taking phentermine x 1 month.  She is taking terbinafine daily for foot fungus but no other medication changes.  Cramping has improved since her Mirena was removed.  She reports unprotected intercourse day before yesterday.  Has not had a period since Mirena was removed last month.         GYNECOLOGIC HISTORY:  Current contraceptive: none  Last Pap: 05/15/2023        OBSTETRIC HISTORY:                    OB History    Para Term  AB Living   4 4 4     4   SAB IAB Ectopic Molar Multiple Live Births              4       # Outcome Date GA Lbr Terell/2nd Weight Sex Delivery Anes PTL Lv   4 Term  40w0d     M Vag-Spont     CHRISTEN   3 Term  40w0d     F Vag-Spont     CHRISTEN   2 Term  40w0d     F Vag-Spont     CHRISTEN   1 Term  38w0d     F Vag-Spont   N CHRISTEN         MEDICAL HISTORY:  Past Medical History   No past medical history on file.        MEDICATIONS:         Current Outpatient Medications on File Prior to Visit   Medication Sig Dispense Refill    phentermine 15 MG capsule Take 15 mg by mouth every morning.        norethindrone-ethinyl estradiol (LOESTRIN FE ) 1-20 MG-MCG per tablet Take 1 Tablet by mouth every day. 28 Tablet 3    phenazopyridine (PYRIDIUM) 200 MG Tab Take 1 Tablet by mouth 3 times a day as needed for Mild Pain. (Patient not taking: Reported on 5/15/2023) 6 Tablet 0    ibuprofen (MOTRIN) 600 MG Tab Take 1 Tab by mouth every 6 hours as needed (For cramping after delivery; do not give if patient is receiving ketorolac (Toradol)). (Patient not taking: Reported on 5/15/2023) 30 Tab 0      No current facility-administered medications on file prior to visit.         ALLERGIES /  REACTIONS:  No Known Allergies     FAMILY HISTORY:  Family History         Family History   Problem Relation Age of Onset    No Known Problems Mother      No Known Problems Father              SURGICAL HISTORY:  Past Surgical History         Past Surgical History:   Procedure Laterality Date    ABDOMINOPLASTY   2011    INGUINAL HERNIA REPAIR CHILD                SOCIAL HISTORY:   reports that she has never smoked. She has never used smokeless tobacco. She reports current alcohol use. She reports that she does not use drugs.  Social History               Socioeconomic History    Marital status: Single       Spouse name: Not on file    Number of children: Not on file    Years of education: Not on file    Highest education level: Not on file   Occupational History    Not on file   Tobacco Use    Smoking status: Never    Smokeless tobacco: Never   Vaping Use    Vaping Use: Never used   Substance and Sexual Activity    Alcohol use: Yes       Comment: occ    Drug use: No    Sexual activity: Yes       Birth control/protection: I.U.D.   Other Topics Concern    Not on file   Social History Narrative    Not on file      Social Determinants of Health      Financial Resource Strain: Not on file   Food Insecurity: Not on file   Transportation Needs: Not on file   Physical Activity: Not on file   Stress: Not on file   Social Connections: Not on file   Intimate Partner Violence: Not on file   Housing Stability: Not on file               ROS:  General: Fever: no  HEENT: Sore Throat: no  CV: Chest Pain: no  Repiratory: Shortness of Breath: no  GI: Abdominal pain: no  : Dysuria: no     PHYSICAL EXAMINATION:  Vital Signs:   Vitals       Vitals:     06/21/23 0857   BP: 128/76   BP Location: Right arm   Patient Position: Sitting   BP Cuff Size: Adult   Weight: 188 lb 3.2 oz         Appearance/Psychiatric: She does not appear anxious.  Constitutional: The patient is well nourished.  Neck: Neck appears symmetric.  Heart: regular rate  and rhythm  Lungs:clear to auscultation, Respirations unlabored, and no use of accessory muscles of inspiration noted  Abd: Soft, flat and non-tender and No masses or organomegaly  Extremeties: Legs are symmetric and without tenderness.  Skin: No rash observed.        LABS / IMAGING:  No new labs or imaging        ASSESSMENT AND PLAN:  37 y.o.       1. Preoperative exam for gynecologic surgery          2. Consultation for female sterilization                Surgery indications: desires permanent sterilization     Surgeries planned: laparoscopic bilateral salpingectomy   - reviewed surgical procedure, general anesthesia and plan for outpatient procedure   - reviewed postop pain control with alternating tylenol/motrin. Narcotics for breakthrough pain first 1-2 days.   - reviewed recovery process at home for POD 0/1 and the first two weeks until postop visit. Reviewed activities and restrictions   - reviewed restrictions including no heavy lifting, nothing in the vagina ,and no tub baths for 2-4 weeks.      - postop meds prescribed today   - all questions answered to patient's satisfaction.      Total Time: 28 minutes spent in chart review, exam, counseling and documentation     Ariel Watkins D.O.

## 2023-08-21 NOTE — OR NURSING
1549 Arrived from OR. ID verified. Report received. 6L 02 mask respirations even and unlabored. X3 lap sites covered with Derma tee cdi, jay jay pad clean vss.     1600 Tolerating PO fluids.     1604 Oxycodone given for pain as ordered.     1609 mom at the bedside updated plan of care.     1711 Handoff to Liudmila TAN

## 2023-08-21 NOTE — ANESTHESIA TIME REPORT
Anesthesia Start and Stop Event Times     Date Time Event    8/21/2023 1436 Ready for Procedure     1442 Anesthesia Start     1552 Anesthesia Stop        Responsible Staff  08/21/23    Name Role Begin End    Franck Peters M.D. Anesth 1442 1556        Overtime Reason:  no overtime (within assigned shift)    Comments:

## 2023-08-21 NOTE — OP REPORT
Operative Report    Preoperative diagnosis: Desires permanent sterilization    Postoperative diagnosis: Same as above    Surgeon: Ariel Watkins D.O.  Assistant: Pam Roman M.D.; SUSANA Palmer, RNFA    Urine output: 30 cc  EBL: 10 cc    Findings: Normal appearing liver edge; normal appearing uterus, bilateral fallopian tubes and ovaries.  No endometrial implants appreciated.     Procedure:    The risks, benefits, indications and alternatives of the procedure were reviewed with the patient and informed consent was obtained. The patient was taken to the operating room where general anesthesia was obtained without difficulty. The patient was then placed in the low lithotomy position using Gel-Padded Filemon Stirrups. An exam under anesthesia was then performed and significant for the above noted findings. Patient was then prepped and draped in the usual sterile fashion.    A Staples catheter was inserted. A sterile speculum was placed in the patient’s vagina and the cervix was visualized. A single tooth tenaculum was used to grasp the anterior lip of the cervix. An acorn uterine manipulator was then placed inside the cervix and the speculum was removed.    Attention was then turned to the patient’s abdomen where a 5mm skin incision was made.  Due to her history of abdominoplasty, this incision was made approx 2-3 mm superior to the umbilicus after injection of 0.25% marcaine with epinephrine. A 5mm trocar and sleeve were then carefully introduced into the peritoneal cavity at a 90-degree angle while tenting up the abdominal wall. Intra-peritoneal placement was confirmed by direct with the laparoscope with entry pressure of 5 mm Hg. A pneumoperitoneum was obtained with several liters of CO2 gas, maximum pressure of 15 mm Hg. Upon entry into the peritoneal cavity, structures immediately below the  incision were inspected and found to be free of injury.    A survey of the patient’s abdomen and pelvis were notable  for the above findings    Two additional 5mm trocars, one on the LEFT and one on the RIGHT lateral aspect of the abdominal wall, were then placed under direct laparoscopic visualization after injection of 0.25% marcaine with epinephrine at each site. Using an atraumatic grasper, the LEFT fallopian tube was tented up. In a stepwise fashion, the LEFT fallopian tube was removed from the fimbriated end to the cornual end with ultimate excision of the fallopian tube using the Ligasure. The same procedure was performed on the patient’s RIGHT side to excise the RIGHT fallopian tube. Both fallopian tubes were removed from the abdomen via the lateral port sites. All pedicles were noted to be hemostatic.    The gas was then turned off and all CO2 was removed from the patient’s abdomen. The trocars were removed. Skin incisions were then reapproximated using 4-0 monocryl and Dermabond. The uterine manipulator was then removed with excellent hemostasis noted.     At the completion of the case all instruments were removed from the vagina and the sponge count and needle counts were correct x 2. The patient was taken to the PACU in stable condition.    Ariel Watkins D.O., FACOG

## 2023-08-21 NOTE — ANESTHESIA PROCEDURE NOTES
Airway    Date/Time: 8/21/2023 2:53 PM    Performed by: Franck Peters M.D.  Authorized by: Franck Peters M.D.    Location:  OR  Urgency:  Elective  Indications for Airway Management:  Anesthesia      Spontaneous Ventilation: absent    Sedation Level:  Deep  Preoxygenated: Yes    Patient Position:  Sniffing  Mask Difficulty Assessment:  1 - vent by mask  Final Airway Type:  Endotracheal airway  Final Endotracheal Airway:  ETT  Cuffed: Yes    Technique Used for Successful ETT Placement:  Direct laryngoscopy    Insertion Site:  Oral  Blade Type:  Ajit  Laryngoscope Blade/Videolaryngoscope Blade Size:  3  ETT Size (mm):  7.0  Measured from:  Teeth  ETT to Teeth (cm):  20  Placement Verified by: auscultation and capnometry    Cormack-Lehane Classification:  Grade I - full view of glottis  Number of Attempts at Approach:  1

## 2023-08-21 NOTE — DISCHARGE INSTRUCTIONS
HOME CARE INSTRUCTIONS    ACTIVITY: Rest and take it easy for the first 24 hours.  A responsible adult is recommended to remain with you during that time.  It is normal to feel sleepy.  We encourage you to not do anything that requires balance, judgment or coordination.    FOR 24 HOURS DO NOT:  Drive, operate machinery or run household appliances.  Drink beer or alcoholic beverages.  Make important decisions or sign legal documents.    SPECIAL INSTRUCTIONS: see handout    DIET: To avoid nausea, slowly advance diet as tolerated, avoiding spicy or greasy foods for the first day.  Add more substantial food to your diet according to your physician's instructions.  Babies can be fed formula or breast milk as soon as they are hungry.  INCREASE FLUIDS AND FIBER TO AVOID CONSTIPATION.    MEDICATIONS: Resume taking daily medication.  Take prescribed pain medication with food.  If no medication is prescribed, you may take non-aspirin pain medication if needed.  PAIN MEDICATION CAN BE VERY CONSTIPATING.  Take a stool softener or laxative such as senokot, pericolace, or milk of magnesia if needed.    Prescription given for none.  Last pain medication Tylenol, gabapentin Celebrex ( ibuprofen like medication) given at 1:58 PM Oxycodone given at 4:04 PM next dose of tylenol if needed 8 PM     A follow-up appointment should be arranged with your doctor in [phone]; call to schedule.    You should CALL YOUR PHYSICIAN if you develop:  Fever greater than 101 degrees F.  Pain not relieved by medication, or persistent nausea or vomiting.  Excessive bleeding (blood soaking through dressing) or unexpected drainage from the wound.  Extreme redness or swelling around the incision site, drainage of pus or foul smelling drainage.  Inability to urinate or empty your bladder within 8 hours.  Problems with breathing or chest pain.    You should call 911 if you develop problems with breathing or chest pain.  If you are unable to contact your  doctor or surgical center, you should go to the nearest emergency room or urgent care center.  Physician's telephone #: [phone]    MILD FLU-LIKE SYMPTOMS ARE NORMAL.  YOU MAY EXPERIENCE GENERALIZED MUSCLE ACHES, THROAT IRRITATION, HEADACHE AND/OR SOME NAUSEA.    If any questions arise, call your doctor.  If your doctor is not available, please feel free to call the Surgical Center at [phone].  The Center is open Monday through Friday from 7AM to 7PM.      A registered nurse may call you a few days after your surgery to see how you are doing after your procedure.    You may also receive a survey in the mail within the next two weeks and we ask that you take a few moments to complete the survey and return it to us.  Our goal is to provide you with very good care and we value your comments.     Depression / Suicide Risk    As you are discharged from this [facility] facility, it is important to learn how to keep safe from harming yourself.    Recognize the warning signs:  Abrupt changes in personality, positive or negative- including increase in energy   Giving away possessions  Change in eating patterns- significant weight changes-  positive or negative  Change in sleeping patterns- unable to sleep or sleeping all the time   Unwillingness or inability to communicate  Depression  Unusual sadness, discouragement and loneliness  Talk of wanting to die  Neglect of personal appearance   Rebelliousness- reckless behavior  Withdrawal from people/activities they love  Confusion- inability to concentrate     If you or a loved one observes any of these behaviors or has concerns about self-harm, here's what you can do:  Talk about it- your feelings and reasons for harming yourself  Remove any means that you might use to hurt yourself (examples: pills, rope, extension cords, firearm)  Get professional help from the community (Mental Health, Substance Abuse, psychological counseling)  Do not be alone:Call your Safe  Contact- someone whom you trust who will be there for you.  Call your local CRISIS HOTLINE 968-7913 or 873-506-4515  Call your local Children's Mobile Crisis Response Team [place] (196) 787-4812 or www.[employer]  Call the toll free National Suicide Prevention Hotlines   National Suicide Prevention Lifeline 131-486-YCIZ (1737)  [facility] Line Network 800-GAGHEJT (700-0316)    I acknowledge receipt and understanding of these Home Care instructions.

## 2023-08-22 ENCOUNTER — TELEPHONE (OUTPATIENT)
Dept: OBGYN | Facility: CLINIC | Age: 38
End: 2023-08-22
Payer: COMMERCIAL

## 2023-08-22 NOTE — OR NURSING
1711 Report from Turner TAN. Patient on room air at 95 % O2. VSS. Monitor connected.  S/P Lap bilateral salpingectomy, incision x3 on abdomen, dressing CDI.     1725 Patient up to restroom. Patient getting dressed with assistance from Mom.     1732 Patient voided without complication. IV removed. Patient escorted out to responsible adult via wheelchair by RN. Belongings with patient, ambulated with steady gait. Discharge paperwork and instructions reviewed, signed, and sent with patient.

## 2023-08-22 NOTE — TELEPHONE ENCOUNTER
Post-operative phone call    I called Ms. Baker and confirmed her identity. She is POD 1 s/p Laparoscopic bilateral salpingectomy.     She reports doing well, pain controlled, ambulating, tolerating regular diet, passing gas,  not having bowel movement and emptying her bladder well. Denies nausea, vomiting, fever, chills, SOB, heavy vaginal bleeding. Patient is planning to go back to work in one week.   All questions answered.     She will follow up as scheduled on 9/12/23 with Dr. Watkins, or earlier if any issues arise.      SUSANA Palmer, RNFA

## 2023-08-22 NOTE — ANESTHESIA POSTPROCEDURE EVALUATION
Patient: Yi Baker    Procedure Summary     Date: 08/21/23 Room / Location: [facility] ROOM 27 / SURGERY SAME DAY [facility]    Anesthesia Start: 1442 Anesthesia Stop: 1552    Procedure: LAPAROSCOPIC BILATERAL SALPINGECTOMY, PELVIC EXAM UNDER ANESTHESIA (Abdomen) Diagnosis: (PERMANENT STERILIZATION)    Surgeons: Ariel Watkins D.O. Responsible Provider: Franck Peters M.D.    Anesthesia Type: general ASA Status: 2          Final Anesthesia Type: general  Last vitals  BP   Blood Pressure: 123/62    Temp   36.3 °C (97.3 °F)    Pulse   70   Resp   18    SpO2   93 %      Anesthesia Post Evaluation    Patient location during evaluation: PACU  Patient participation: complete - patient participated  Level of consciousness: sleepy but conscious  Pain score: 2    Airway patency: patent  Anesthetic complications: no  Cardiovascular status: hemodynamically stable  Respiratory status: acceptable  Hydration status: balanced    PONV: none          There were no known notable events for this encounter.     Nurse Pain Score: 2 (NPRS)

## 2023-09-11 ENCOUNTER — OFFICE VISIT (OUTPATIENT)
Dept: MEDICAL GROUP | Facility: CLINIC | Age: 38
End: 2023-09-11
Payer: COMMERCIAL

## 2023-09-11 VITALS
OXYGEN SATURATION: 99 % | HEIGHT: 62 IN | DIASTOLIC BLOOD PRESSURE: 78 MMHG | WEIGHT: 190 LBS | TEMPERATURE: 98.6 F | HEART RATE: 60 BPM | RESPIRATION RATE: 16 BRPM | SYSTOLIC BLOOD PRESSURE: 110 MMHG | BODY MASS INDEX: 34.96 KG/M2

## 2023-09-11 PROCEDURE — 99213 OFFICE O/P EST LOW 20 MIN: CPT | Mod: GE

## 2023-09-11 PROCEDURE — 3074F SYST BP LT 130 MM HG: CPT

## 2023-09-11 PROCEDURE — 3078F DIAST BP <80 MM HG: CPT

## 2023-09-11 ASSESSMENT — PATIENT HEALTH QUESTIONNAIRE - PHQ9: CLINICAL INTERPRETATION OF PHQ2 SCORE: 0

## 2023-09-11 ASSESSMENT — FIBROSIS 4 INDEX: FIB4 SCORE: .4567734398020992367

## 2023-09-12 ENCOUNTER — APPOINTMENT (OUTPATIENT)
Dept: OBGYN | Facility: CLINIC | Age: 38
End: 2023-09-12
Payer: COMMERCIAL

## 2023-09-12 NOTE — PROGRESS NOTES
"Subjective:     CC: For weight loss discussion    HPI:   Yi presents today for weight loss discussion.  Patient reports for the past several years since the birth of her last child she has gradually been gaining weight.  She feels that her diet is adequate however she does not report daily exercise.  She has struggled with her weight for years.  She has tried phentermine this past year and this worked well for patient however she then discontinued it and regained weight.  Patient is very concerned as she is almost 200 pounds.  She previously completed A1c and lipid panel and this was within normal limits.  Patient would like to consider bariatric surgery.  She is also concerned as she has a family history of hypothyroid is him and has not previously been evaluated for this.    Current Outpatient Medications Ordered in Epic   Medication Sig Dispense Refill    norethindrone-ethinyl estradiol (LOESTRIN FE 1/20) 1-20 MG-MCG per tablet Take 1 Tablet by mouth every day. (Patient not taking: Reported on 9/11/2023) 28 Tablet 3     No current Epic-ordered facility-administered medications on file.       ROS:  Gen: no fevers/chills, no changes in weight  Pulm: no sob, no cough  CV: no chest pain, no palpitations  GI: no nausea/vomiting, no diarrhea    Objective:     Exam:  /78 (BP Location: Right arm, Patient Position: Sitting, BP Cuff Size: Adult)   Pulse 60   Temp 37 °C (98.6 °F) (Temporal)   Resp 16   Ht 1.575 m (5' 2\")   Wt 86.2 kg (190 lb)   LMP 09/03/2023 (Exact Date)   SpO2 99%   Breastfeeding No   BMI 34.75 kg/m²  Body mass index is 34.75 kg/m².    Gen: Alert and oriented, No apparent distress.  Neck: Neck is supple without lymphadenopathy.  Lungs: Normal effort, CTA bilaterally, no wheezes, rhonchi, or rales  CV: Regular rate and rhythm. No murmurs, rubs, or gallops.  Ext: No clubbing, cyanosis, edema.    Labs: none     Assessment & Plan:     37 y.o. female with the following -     1. BMI " 34.0-34.9,adult  Patient is here for weight loss discussion.  Patient has previously been on phentermine with some improvement in weight however patient has since regained weight.  I strongly advised patient to document her current daily caloric intake as well as physical activity level daily and return to clinic to discuss her current intake and physical activity level.  I also will refer her to our lifestyle medicine group sessions.  I will also order TSH to further evaluate patient's thyroid.  Patient would also like to try a trial of Ozempic. Discussed with patient this may not be covered by her insurance, she would still like to attempt to proceed with this medication. Also discussed with patient she does not qualify for bariatric surgery. We will attempt to order and have patient follow-up.  - TSH WITH REFLEX TO FT4; Future      Belinda Lacey M.D.  PGY-2

## 2023-09-18 ENCOUNTER — GYNECOLOGY VISIT (OUTPATIENT)
Dept: OBGYN | Facility: CLINIC | Age: 38
End: 2023-09-18
Payer: COMMERCIAL

## 2023-09-18 VITALS — DIASTOLIC BLOOD PRESSURE: 68 MMHG | WEIGHT: 188.6 LBS | BODY MASS INDEX: 34.5 KG/M2 | SYSTOLIC BLOOD PRESSURE: 128 MMHG

## 2023-09-18 DIAGNOSIS — Z98.890 HISTORY OF FEMALE STERILIZATION: ICD-10-CM

## 2023-09-18 DIAGNOSIS — Z48.816 AFTERCARE FOLLOWING SURGERY OF THE GENITOURINARY SYSTEM: ICD-10-CM

## 2023-09-18 PROCEDURE — 99999 PR NO CHARGE: CPT | Performed by: STUDENT IN AN ORGANIZED HEALTH CARE EDUCATION/TRAINING PROGRAM

## 2023-09-18 ASSESSMENT — FIBROSIS 4 INDEX: FIB4 SCORE: .4567734398020992367

## 2023-09-18 NOTE — PROGRESS NOTES
CC: Post-operative check    Date of Surgery: 2023    HPI: Patient is a 37 y.o. year old  who presents for follow up after laparoscopic bilateral salpingectomy. No complaints today.  Denies any pain. Not taking pain meds.  Denies any n/v, fevers/chills, changes in appetite.     ROS:   General: Fever: no  GI: Abdominal pain: no  : Vaginal bleeding: no    PFSH:  I personally reviewed the past medical and surgical histories.     PHYSICAL EXAMINATION:  Vital Signs:   Vitals:    23 1041   BP: 128/68   Weight: 188 lb 9.6 oz     Appearance/Psychiatric: in no apparent distress and well developed and well nourished  Heart: She does not have edema.  Lungs:Respiratory effort is normal.  Abd: soft, nontender, no rebound or guarding, 3 laparoscopic incisions well healed  Pelvic: deferred    ASSESSMENT AND PLAN:  37 y.o.    Yi was seen today for gynecologic exam.    Diagnoses and all orders for this visit:    Aftercare following surgery of the genitourinary system    History of female sterilization    -- reviewed pathology report; normal tubes  -- may resume usual activities  -- all questions answered  -- return for annual visit, LIN Watkins D.O.  2023

## 2023-09-18 NOTE — PROGRESS NOTES
Patient here for GYN follow up for Post-op  BT^L on 8/21/2023  Last seen on: 6/21/2023  Pt states no complaints.

## 2024-07-02 ENCOUNTER — APPOINTMENT (OUTPATIENT)
Dept: MEDICAL GROUP | Facility: CLINIC | Age: 39
End: 2024-07-02
Payer: COMMERCIAL

## 2025-01-04 ENCOUNTER — APPOINTMENT (OUTPATIENT)
Dept: RADIOLOGY | Facility: MEDICAL CENTER | Age: 40
End: 2025-01-04
Attending: STUDENT IN AN ORGANIZED HEALTH CARE EDUCATION/TRAINING PROGRAM
Payer: COMMERCIAL

## 2025-01-04 ENCOUNTER — HOSPITAL ENCOUNTER (EMERGENCY)
Facility: MEDICAL CENTER | Age: 40
End: 2025-01-04
Attending: STUDENT IN AN ORGANIZED HEALTH CARE EDUCATION/TRAINING PROGRAM
Payer: COMMERCIAL

## 2025-01-04 VITALS
WEIGHT: 183.42 LBS | SYSTOLIC BLOOD PRESSURE: 113 MMHG | BODY MASS INDEX: 33.75 KG/M2 | DIASTOLIC BLOOD PRESSURE: 56 MMHG | HEART RATE: 63 BPM | OXYGEN SATURATION: 98 % | RESPIRATION RATE: 16 BRPM | HEIGHT: 62 IN | TEMPERATURE: 97.5 F

## 2025-01-04 DIAGNOSIS — G89.29 CHRONIC NONINTRACTABLE HEADACHE, UNSPECIFIED HEADACHE TYPE: ICD-10-CM

## 2025-01-04 DIAGNOSIS — R51.9 CHRONIC NONINTRACTABLE HEADACHE, UNSPECIFIED HEADACHE TYPE: ICD-10-CM

## 2025-01-04 DIAGNOSIS — R07.9 CHEST PAIN, UNSPECIFIED TYPE: ICD-10-CM

## 2025-01-04 LAB
ALBUMIN SERPL BCP-MCNC: 4.2 G/DL (ref 3.2–4.9)
ALBUMIN/GLOB SERPL: 1.5 G/DL
ALP SERPL-CCNC: 78 U/L (ref 30–99)
ALT SERPL-CCNC: 11 U/L (ref 2–50)
ANION GAP SERPL CALC-SCNC: 10 MMOL/L (ref 7–16)
AST SERPL-CCNC: 16 U/L (ref 12–45)
BASOPHILS # BLD AUTO: 0.5 % (ref 0–1.8)
BASOPHILS # BLD: 0.03 K/UL (ref 0–0.12)
BILIRUB SERPL-MCNC: 0.3 MG/DL (ref 0.1–1.5)
BUN SERPL-MCNC: 12 MG/DL (ref 8–22)
CALCIUM ALBUM COR SERPL-MCNC: 9.1 MG/DL (ref 8.5–10.5)
CALCIUM SERPL-MCNC: 9.3 MG/DL (ref 8.5–10.5)
CHLORIDE SERPL-SCNC: 106 MMOL/L (ref 96–112)
CO2 SERPL-SCNC: 23 MMOL/L (ref 20–33)
COHGB MFR BLD: 1.4 % (ref 0–4.9)
CREAT SERPL-MCNC: 0.57 MG/DL (ref 0.5–1.4)
EKG IMPRESSION: NORMAL
EOSINOPHIL # BLD AUTO: 0.15 K/UL (ref 0–0.51)
EOSINOPHIL NFR BLD: 2.4 % (ref 0–6.9)
ERYTHROCYTE [DISTWIDTH] IN BLOOD BY AUTOMATED COUNT: 45.5 FL (ref 35.9–50)
GFR SERPLBLD CREATININE-BSD FMLA CKD-EPI: 120 ML/MIN/1.73 M 2
GLOBULIN SER CALC-MCNC: 2.8 G/DL (ref 1.9–3.5)
GLUCOSE SERPL-MCNC: 88 MG/DL (ref 65–99)
HCG SERPL QL: NEGATIVE
HCT VFR BLD AUTO: 38 % (ref 37–47)
HGB BLD-MCNC: 12.2 G/DL (ref 12–16)
IMM GRANULOCYTES # BLD AUTO: 0.01 K/UL (ref 0–0.11)
IMM GRANULOCYTES NFR BLD AUTO: 0.2 % (ref 0–0.9)
LYMPHOCYTES # BLD AUTO: 1.64 K/UL (ref 1–4.8)
LYMPHOCYTES NFR BLD: 25.9 % (ref 22–41)
MCH RBC QN AUTO: 29.7 PG (ref 27–33)
MCHC RBC AUTO-ENTMCNC: 32.1 G/DL (ref 32.2–35.5)
MCV RBC AUTO: 92.5 FL (ref 81.4–97.8)
MONOCYTES # BLD AUTO: 0.55 K/UL (ref 0–0.85)
MONOCYTES NFR BLD AUTO: 8.7 % (ref 0–13.4)
NEUTROPHILS # BLD AUTO: 3.96 K/UL (ref 1.82–7.42)
NEUTROPHILS NFR BLD: 62.3 % (ref 44–72)
NRBC # BLD AUTO: 0 K/UL
NRBC BLD-RTO: 0 /100 WBC (ref 0–0.2)
NT-PROBNP SERPL IA-MCNC: 62 PG/ML (ref 0–125)
PLATELET # BLD AUTO: 332 K/UL (ref 164–446)
PMV BLD AUTO: 10.1 FL (ref 9–12.9)
POTASSIUM SERPL-SCNC: 4.1 MMOL/L (ref 3.6–5.5)
PROT SERPL-MCNC: 7 G/DL (ref 6–8.2)
RBC # BLD AUTO: 4.11 M/UL (ref 4.2–5.4)
SODIUM SERPL-SCNC: 139 MMOL/L (ref 135–145)
TROPONIN T SERPL-MCNC: <6 NG/L (ref 6–19)
TROPONIN T SERPL-MCNC: <6 NG/L (ref 6–19)
WBC # BLD AUTO: 6.3 K/UL (ref 4.8–10.8)

## 2025-01-04 PROCEDURE — A9270 NON-COVERED ITEM OR SERVICE: HCPCS | Mod: UD | Performed by: STUDENT IN AN ORGANIZED HEALTH CARE EDUCATION/TRAINING PROGRAM

## 2025-01-04 PROCEDURE — 84703 CHORIONIC GONADOTROPIN ASSAY: CPT

## 2025-01-04 PROCEDURE — 83880 ASSAY OF NATRIURETIC PEPTIDE: CPT

## 2025-01-04 PROCEDURE — 93005 ELECTROCARDIOGRAM TRACING: CPT | Mod: TC

## 2025-01-04 PROCEDURE — 700102 HCHG RX REV CODE 250 W/ 637 OVERRIDE(OP): Mod: UD | Performed by: STUDENT IN AN ORGANIZED HEALTH CARE EDUCATION/TRAINING PROGRAM

## 2025-01-04 PROCEDURE — 36415 COLL VENOUS BLD VENIPUNCTURE: CPT

## 2025-01-04 PROCEDURE — 82375 ASSAY CARBOXYHB QUANT: CPT

## 2025-01-04 PROCEDURE — 96372 THER/PROPH/DIAG INJ SC/IM: CPT

## 2025-01-04 PROCEDURE — 80053 COMPREHEN METABOLIC PANEL: CPT

## 2025-01-04 PROCEDURE — 700111 HCHG RX REV CODE 636 W/ 250 OVERRIDE (IP): Mod: UD | Performed by: STUDENT IN AN ORGANIZED HEALTH CARE EDUCATION/TRAINING PROGRAM

## 2025-01-04 PROCEDURE — 84484 ASSAY OF TROPONIN QUANT: CPT

## 2025-01-04 PROCEDURE — 71045 X-RAY EXAM CHEST 1 VIEW: CPT

## 2025-01-04 PROCEDURE — 85025 COMPLETE CBC W/AUTO DIFF WBC: CPT

## 2025-01-04 PROCEDURE — 99284 EMERGENCY DEPT VISIT MOD MDM: CPT

## 2025-01-04 PROCEDURE — 93005 ELECTROCARDIOGRAM TRACING: CPT | Mod: TC | Performed by: STUDENT IN AN ORGANIZED HEALTH CARE EDUCATION/TRAINING PROGRAM

## 2025-01-04 RX ORDER — KETOROLAC TROMETHAMINE 15 MG/ML
15 INJECTION, SOLUTION INTRAMUSCULAR; INTRAVENOUS ONCE
Status: COMPLETED | OUTPATIENT
Start: 2025-01-04 | End: 2025-01-04

## 2025-01-04 RX ORDER — ACETAMINOPHEN 500 MG
1000 TABLET ORAL ONCE
Status: COMPLETED | OUTPATIENT
Start: 2025-01-04 | End: 2025-01-04

## 2025-01-04 RX ORDER — ONDANSETRON 4 MG/1
4 TABLET, ORALLY DISINTEGRATING ORAL ONCE
Status: COMPLETED | OUTPATIENT
Start: 2025-01-04 | End: 2025-01-04

## 2025-01-04 RX ADMIN — KETOROLAC TROMETHAMINE 15 MG: 15 INJECTION, SOLUTION INTRAMUSCULAR; INTRAVENOUS at 12:53

## 2025-01-04 RX ADMIN — ONDANSETRON 4 MG: 4 TABLET, ORALLY DISINTEGRATING ORAL at 12:53

## 2025-01-04 RX ADMIN — ACETAMINOPHEN 1000 MG: 500 TABLET ORAL at 12:53

## 2025-01-04 ASSESSMENT — HEART SCORE
ECG: NORMAL
RISK FACTORS: 1-2 RISK FACTORS
HEART SCORE: 1
TROPONIN: LESS THAN OR EQUAL TO NORMAL LIMIT
AGE: <45
HISTORY: SLIGHTLY SUSPICIOUS

## 2025-01-04 ASSESSMENT — PAIN DESCRIPTION - PAIN TYPE: TYPE: ACUTE PAIN

## 2025-01-04 NOTE — ED PROVIDER NOTES
ED Provider Note    CHIEF COMPLAINT  Chief Complaint   Patient presents with    Chest Pain     Headache x1 week, at work started feeling dizzy and then began to have left sided chest pain. (+) SOB. Denies cardiac hx.     Headache       EXTERNAL RECORDS REVIEWED  Outpatient Notes family medicine note from 9/11/2023 patient seen for weight loss discussion    HPI/ROS  LIMITATION TO HISTORY     OUTSIDE HISTORIAN(S):      Yi Baker is a 39 y.o. female who presents with headache, fatigue and chest pain.  Patient says for the past 1 to 2 weeks she has been having waxing waning headache.  Patient denies severe or rapid onset headache.  Patient denies recent trauma.  Patient denies change in vision, change in speech, extremity weakness, numbness, gait instability.  Patient says that today while at work she had onset of anterior chest pain.  Patient does not take any hormone therapy or birth control no recent surgeries no leg swelling or pain no history of venous thrombosis.  Patient denies associated shortness of breath, diaphoresis or vomiting.    PAST MEDICAL HISTORY       SURGICAL HISTORY   has a past surgical history that includes inguinal hernia repair child; abdominoplasty (2011); and lap,diagnostic abdomen (N/A, 8/21/2023).    FAMILY HISTORY  Family History   Problem Relation Age of Onset    No Known Problems Mother     No Known Problems Father        SOCIAL HISTORY  Social History     Tobacco Use    Smoking status: Never    Smokeless tobacco: Never   Vaping Use    Vaping status: Never Used   Substance and Sexual Activity    Alcohol use: Yes     Comment: occ    Drug use: No    Sexual activity: Yes     Birth control/protection: I.U.D.       CURRENT MEDICATIONS  Home Medications       Reviewed by Mare Long R.N. (Registered Nurse) on 01/04/25 at 1054  Med List Status: Partial     Medication Last Dose Status   norethindrone-ethinyl estradiol (LOESTRIN FE 1/20) 1-20 MG-MCG per tablet  Active  "  Semaglutide,0.25 or 0.5MG/DOS, 2 MG/1.5ML Solution Pen-injector  Active                  Audit from Redirected Encounters    **Home medications have not yet been reviewed for this encounter**         ALLERGIES  No Known Allergies    PHYSICAL EXAM  VITAL SIGNS: /56   Pulse 63   Temp 36 °C (96.8 °F) (Temporal)   Resp 16   Ht 1.575 m (5' 2\")   Wt 83.2 kg (183 lb 6.8 oz)   SpO2 98%   BMI 33.55 kg/m²    Constitutional: Alert in no apparent distress.  HENT: No signs of trauma, Bilateral external ears normal, Nose normal.   Eyes: Pupils are equal and reactive, Conjunctiva normal, Non-icteric.   Neck: Normal range of motion, No tenderness, Supple, No stridor.   Cardiovascular: Regular rate and rhythm, no murmurs.   Thorax & Lungs: Normal breath sounds, No respiratory distress, No wheezing, No chest tenderness.   Abdomen: Bowel sounds normal, Soft, No tenderness, No masses, No pulsatile masses.   Skin: Warm, Dry, No erythema, No rash.   Back: No bony tenderness, No CVA tenderness.   Extremities: Intact distal pulses, No edema, No tenderness, No cyanosis  Musculoskeletal: Good range of motion in all major joints. No tenderness to palpation or major deformities noted.   Neurologic: Alert , Normal motor function, Normal sensory function, No focal deficits noted.   Normal speech, no facial asymmetry, visual fields intact, normal finger-to-nose and heel-to-shin, 5/5 strength in all 4 extremities, sensation intact to light touch throughout the face and all 4 extremities        EKG/LABS  Labs Reviewed   CBC WITH DIFFERENTIAL - Abnormal; Notable for the following components:       Result Value    RBC 4.11 (*)     MCHC 32.1 (*)     All other components within normal limits   COMP METABOLIC PANEL   PROBRAIN NATRIURETIC PEPTIDE, NT   TROPONIN   HCG QUAL SERUM   ESTIMATED GFR   CARBOXYHEMOGLOBIN   TROPONIN     Results for orders placed or performed during the hospital encounter of 01/04/25   EKG   Result Value Ref Range "    Report       [facility] Emergency Dept[facility]    Test Date:  2025  Pt Name:    JOIE SEGOVIA              Department: ER  MRN:        [id number]                      Room:  Gender:     Female                       Technician: [id number]  :        1985                   Requested By:ER TRIAGE PROTOCOL  Order #:    [id number]                    Reading MD: Milton Frank    Measurements  Intervals                                Axis  Rate:       68                           P:          24  NJ:         147                          QRS:        -2  QRSD:       100                          T:          17  QT:         421  QTc:        448    Interpretive Statements  Interpreted by me: Sinus rhythm, rate 68, no signs of acute ischemia  Electronically Signed On 2025 12:21:25 PST by Milton Frank         I have independently interpreted this EKG    RADIOLOGY/PROCEDURES   I have independently interpreted the diagnostic imaging associated with this visit and am waiting the final reading from the radiologist.   My preliminary interpretation is as follows: No pneumothorax    Radiologist interpretation:  DX-CHEST-PORTABLE (1 VIEW)   Final Result         No acute cardiac or pulmonary abnormality is identified.          COURSE & MEDICAL DECISION MAKING    ASSESSMENT, COURSE AND PLAN  Care Narrative: Patient presenting with mild to moderate headache over the past 2 weeks no rapid onset or severe headache which would suggest subarachnoid hemorrhage.  Patient has normal neurologic exam low suspicion for CVA or dissection.  Patient is afebrile no neck stiffness normal mentation low suspicion for meningitis or encephalitis.  There are no signs of trauma.  Regarding patient's chest pain she has no risk factors for PE is currently off of contraceptives, PERC negative.  Patient has no risk factors for ACS, dissection.  History exam not suggestive of pneumonia.  Overall suspect viral syndrome some  degree of dehydration.  Will dose patient with analgesics and antiemetics monitor ability to tolerate p.o.  Will obtain blood work to assess for gross hematologic metabolic derangements, EKG and cardiac biomarkers.    Blood work is unremarkable including normal BNP, troponin CMP and CBC negative pregnancy.  EKG without ischemic changes.  Carbon monoxide level is normal.  Repeat troponin normal.  Patient reports improvement in symptoms after symptomatic treatment.  Patient does report ongoing headache for the last 2 to 3 years has noticed snoring and gasping respirations while sleeping.  Consider possibility of sleep apnea patient was previously referred to sleep medicine but never made an appointment.  Have placed new referral to sleep and pulmonary medicine.  Discussed with patient need to closely follow-up with primary care provider and sleep medicine.  Discussed with patient return precautions and supportive care at home which she is comfortable with.    CHEST PAIN:   HEART Score for Major Cardiac Events  HEART Score     History: Slightly suspicious  ECG: Normal  Age: <45  Risk Factors: 1-2 risk factors  Troponin: Less than or equal to normal limit    Heart Score: 1    Total Score   0-3 Points = Low Score, risk of MACE 0.9-1.7%.  4-6 Points = Moderate Score, risk of MACE 12-16.6%  7-10 Points = High Score, risk of MACE 50-65%          ADDITIONAL PROBLEMS MANAGED      DISPOSITION AND DISCUSSIONS      Decision tools and prescription drugs considered including, but not limited to: PERC rule negative .    FINAL DIAGNOSIS  1. Chest pain, unspecified type    2. Chronic nonintractable headache, unspecified headache type         Electronically signed by: Milton Frank D.O., 1/4/2025 12:30 PM

## 2025-01-04 NOTE — Clinical Note
Yi Baker was seen and treated in our emergency department on 1/4/2025.  She may return to work on 01/07/2025.       If you have any questions or concerns, please don't hesitate to call.      Milton Frank D.O.

## 2025-01-04 NOTE — ED TRIAGE NOTES
"Chief Complaint   Patient presents with    Chest Pain     Headache x1 week, at work started feeling dizzy and then began to have left sided chest pain. (+) SOB. Denies cardiac hx.     Headache       Pt ambulatory to triage. Pt A&Ox4, room air.     Pt to phleb . Pt educated on alerting staff in changes to condition. Pt verbalized understanding. Protocol ordered.     /79   Pulse 72   Temp 36 °C (96.8 °F) (Temporal)   Resp 18   Ht 1.575 m (5' 2\")   Wt 83.2 kg (183 lb 6.8 oz)   SpO2 100%   BMI 33.55 kg/m²     "

## 2025-01-04 NOTE — DISCHARGE INSTRUCTIONS
You should follow-up with your family medicine clinic.  We have placed a new referral for sleep medicine who you should make an appointment with.  If you have uncontrolled headache, chest pain, passing out please return to the emergency room.

## 2025-03-31 ENCOUNTER — TELEPHONE (OUTPATIENT)
Dept: HEALTH INFORMATION MANAGEMENT | Facility: OTHER | Age: 40
End: 2025-03-31
Payer: COMMERCIAL

## 2025-04-29 ENCOUNTER — HOSPITAL ENCOUNTER (OUTPATIENT)
Facility: MEDICAL CENTER | Age: 40
End: 2025-04-29
Payer: COMMERCIAL

## 2025-04-29 ENCOUNTER — GYNECOLOGY VISIT (OUTPATIENT)
Dept: OBGYN | Facility: CLINIC | Age: 40
End: 2025-04-29
Payer: COMMERCIAL

## 2025-04-29 VITALS
DIASTOLIC BLOOD PRESSURE: 80 MMHG | BODY MASS INDEX: 32.76 KG/M2 | SYSTOLIC BLOOD PRESSURE: 108 MMHG | WEIGHT: 178 LBS | HEIGHT: 62 IN

## 2025-04-29 DIAGNOSIS — Z01.419 WELL WOMAN EXAM WITH ROUTINE GYNECOLOGICAL EXAM: Primary | ICD-10-CM

## 2025-04-29 DIAGNOSIS — Z20.2 EXPOSURE TO STD: ICD-10-CM

## 2025-04-29 DIAGNOSIS — Z01.419 WELL WOMAN EXAM WITH ROUTINE GYNECOLOGICAL EXAM: ICD-10-CM

## 2025-04-29 ASSESSMENT — FIBROSIS 4 INDEX: FIB4 SCORE: 0.54

## 2025-04-29 NOTE — PROGRESS NOTES
Patient here for GYN exam.  LMP= 4/15/25  BCM: tubal   Last pap: 5/15/2023  Last mammogram if applies: n/a   Phone number: [phone] (home)   Pharmacy verified

## 2025-04-29 NOTE — PROGRESS NOTES
Yi Baker is a 39 y.o. female who presents for her Gynecologic Exam        HPI Comments: Pt presents for well woman exam. Pt reports having a new partner and is wanting to have STD testing. Patient's last menstrual period was 04/15/2025. She is currently sexually active with a male partner. She reports regular menses that are 5-7 days in length. No clots or heavy bleeding. She is using a bilateral salpingectomy for birth control. Does not desire conception in the next year.  Patient does not report leaking of urine or constipation.     Review of Systems   Pertinent positives documented in HPI and all other systems reviewed & are negative    All PMH, PSH, allergies, social history and FH reviewed and updated today:  History reviewed. No pertinent past medical history.  Past Surgical History:   Procedure Laterality Date    NC LAP,DIAGNOSTIC ABDOMEN N/A 8/21/2023    Procedure: LAPAROSCOPIC BILATERAL SALPINGECTOMY, PELVIC EXAM UNDER ANESTHESIA;  Surgeon: Ariel Watkins D.O.;  Location: SURGERY SAME [facility];  Service: Gynecology    ABDOMINOPLASTY  2011    INGUINAL HERNIA REPAIR CHILD       Patient has no known allergies.  Social History     Socioeconomic History    Marital status: Single   Tobacco Use    Smoking status: Never    Smokeless tobacco: Never   Vaping Use    Vaping status: Never Used   Substance and Sexual Activity    Alcohol use: Yes     Comment: occ    Drug use: No    Sexual activity: Yes     Birth control/protection: I.U.D.     Family History   Problem Relation Age of Onset    No Known Problems Mother     No Known Problems Father      Medications:   Current Outpatient Medications Ordered in Epic   Medication Sig Dispense Refill    Semaglutide,0.25 or 0.5MG/DOS, 2 MG/1.5ML Solution Pen-injector .25 mg SC once weekly, may increase to  .5 mg SC weekly if tolerating. (Patient not taking: Reported on 4/29/2025) 1.5 mL 3    norethindrone-ethinyl estradiol (LOESTRIN FE 1/20) 1-20 MG-MCG per  "tablet Take 1 Tablet by mouth every day. (Patient not taking: Reported on 9/11/2023) 28 Tablet 3     No current Epic-ordered facility-administered medications on file.          Objective:   Vital measurements:  /80   Ht 5' 2\"   Wt 178 lb   Body mass index is 32.56 kg/m². (Goal BM I>18 <25)    Physical Exam   Nursing note and vitals reviewed.  Constitutional: She is oriented to person, place, and time. She appears well-developed and well-nourished. No distress.     HEENT: Normocephalic and atraumatic. External ears normal. Nose normal. Conjunctivae and EOM are normal. Pupils are equal, round, and reactive to light. No scleral icterus.     Neck: Normal range of motion. Neck supple. No thyromegaly present.     Pulmonary/Chest: Effort normal and breath sounds clear in all quadrants. No respiratory distress.     Cardiovascular: Regular, rate and rhythm. No JVD.    Abdominal: Soft. Bowel sounds are normal. She exhibits no distension and no mass. No tenderness. She has no rebound and no guarding.     Breast: Supple, without skin changes, dimpling. No pain with nipple manipulation.     Genitourinary:  Pelvic exam was performed with patient supine.  External genitalia with no abnormal pigmentation, labial fusion,rash, tenderness, lesion or injury to the labia bilaterally.  Vagina is moist with no lesions, foul discharge, erythema, tenderness or bleeding. No foreign body around the vagina or signs of injury.   Cervix exhibits no motion tenderness, no discharge and no friability.    Rectal exam deferred    Musculoskeletal: Normal range of motion. She exhibits no edema and no tenderness.     Lymphadenopathy: She has no cervical adenopathy.     Neurological: She is alert and oriented to person, place, and time. She exhibits normal muscle tone.     Skin: Skin is warm and dry. No rash noted. She is not diaphoretic. No erythema. No pallor.     Psychiatric: She has a normal mood and affect. Her behavior is normal. Judgment " and thought content normal.               Assessment:     1. Well woman exam with routine gynecological exam  THINPREP PAP W/HPV AND CTNG    VAGINAL PATHOGENS DNA PANEL      2. Exposure to STD  HIV AG/AB Combo Assay Screening    T.Pallidum AB ALBER (Screening)    Trichomonas Vaginalis by TMA    Hepatitis C Virus Antibody    HEP B Surface Antibody    Hep B Core AB Total    Hep B Surface Antigen            Plan:   1. Pap, vag path, and physical exam performed. Discussed intervals of paps at current age per guidelines. Will notify of results  2. Encouraged general breast awareness and self breast exam if appropriate. We discussed recommendation of yearly mammogram at age 40.   3. Counseling: breast self exam, STD prevention, and menopause  4. Counseled on lifestyle changes such as: increase water and electrolytes, decrease or eliminate sugar, incorporate movement into daily schedule, 8-10 hours of sleep a night.

## 2025-04-30 ENCOUNTER — RESULTS FOLLOW-UP (OUTPATIENT)
Dept: OBGYN | Facility: CLINIC | Age: 40
End: 2025-04-30

## 2025-04-30 LAB
C TRACH DNA GENITAL QL NAA+PROBE: NEGATIVE
CANDIDA DNA VAG QL PROBE+SIG AMP: NEGATIVE
G VAGINALIS DNA VAG QL PROBE+SIG AMP: NEGATIVE
N GONORRHOEA DNA GENITAL QL NAA+PROBE: NEGATIVE
SPECIMEN SOURCE: NORMAL
T VAGINALIS DNA VAG QL PROBE+SIG AMP: NEGATIVE

## 2025-05-05 LAB
HPV I/H RISK 1 DNA SPEC QL NAA+PROBE: NOT DETECTED
SPECIMEN SOURCE: NORMAL
THINPREP PAP, CYTOLOGY NL11781: NORMAL

## 2025-05-07 DIAGNOSIS — Z20.2 EXPOSURE TO STD: ICD-10-CM

## 2025-06-16 ENCOUNTER — HOSPITAL ENCOUNTER (EMERGENCY)
Facility: MEDICAL CENTER | Age: 40
End: 2025-06-16
Attending: EMERGENCY MEDICINE
Payer: COMMERCIAL

## 2025-06-16 VITALS
TEMPERATURE: 97.8 F | RESPIRATION RATE: 17 BRPM | HEIGHT: 61 IN | DIASTOLIC BLOOD PRESSURE: 77 MMHG | BODY MASS INDEX: 34.34 KG/M2 | SYSTOLIC BLOOD PRESSURE: 121 MMHG | HEART RATE: 79 BPM | WEIGHT: 181.88 LBS | OXYGEN SATURATION: 97 %

## 2025-06-16 DIAGNOSIS — R11.0 NAUSEA: Primary | ICD-10-CM

## 2025-06-16 DIAGNOSIS — R09.81 SINUS CONGESTION: ICD-10-CM

## 2025-06-16 LAB
ALBUMIN SERPL BCP-MCNC: 4.3 G/DL (ref 3.2–4.9)
ALBUMIN/GLOB SERPL: 1.5 G/DL
ALP SERPL-CCNC: 81 U/L (ref 30–99)
ALT SERPL-CCNC: 12 U/L (ref 2–50)
ANION GAP SERPL CALC-SCNC: 10 MMOL/L (ref 7–16)
APPEARANCE UR: CLEAR
AST SERPL-CCNC: 16 U/L (ref 12–45)
BACTERIA #/AREA URNS HPF: ABNORMAL /HPF
BASOPHILS # BLD AUTO: 0.4 % (ref 0–1.8)
BASOPHILS # BLD: 0.03 K/UL (ref 0–0.12)
BILIRUB SERPL-MCNC: 0.3 MG/DL (ref 0.1–1.5)
BILIRUB UR QL STRIP.AUTO: NEGATIVE
BUN SERPL-MCNC: 12 MG/DL (ref 8–22)
CALCIUM ALBUM COR SERPL-MCNC: 9.1 MG/DL (ref 8.5–10.5)
CALCIUM SERPL-MCNC: 9.3 MG/DL (ref 8.5–10.5)
CASTS URNS QL MICRO: ABNORMAL /LPF (ref 0–2)
CHLORIDE SERPL-SCNC: 106 MMOL/L (ref 96–112)
CO2 SERPL-SCNC: 24 MMOL/L (ref 20–33)
COLOR UR: YELLOW
CREAT SERPL-MCNC: 0.76 MG/DL (ref 0.5–1.4)
EKG IMPRESSION: NORMAL
EOSINOPHIL # BLD AUTO: 0.11 K/UL (ref 0–0.51)
EOSINOPHIL NFR BLD: 1.6 % (ref 0–6.9)
EPITHELIAL CELLS 1715: ABNORMAL /HPF (ref 0–5)
ERYTHROCYTE [DISTWIDTH] IN BLOOD BY AUTOMATED COUNT: 47.7 FL (ref 35.9–50)
FLUAV RNA SPEC QL NAA+PROBE: NEGATIVE
FLUBV RNA SPEC QL NAA+PROBE: NEGATIVE
GFR SERPLBLD CREATININE-BSD FMLA CKD-EPI: 103 ML/MIN/1.73 M 2
GLOBULIN SER CALC-MCNC: 2.8 G/DL (ref 1.9–3.5)
GLUCOSE SERPL-MCNC: 84 MG/DL (ref 65–99)
GLUCOSE UR STRIP.AUTO-MCNC: NEGATIVE MG/DL
HCG SERPL QL: NEGATIVE
HCT VFR BLD AUTO: 38.7 % (ref 37–47)
HGB BLD-MCNC: 12.4 G/DL (ref 12–16)
IMM GRANULOCYTES # BLD AUTO: 0.02 K/UL (ref 0–0.11)
IMM GRANULOCYTES NFR BLD AUTO: 0.3 % (ref 0–0.9)
KETONES UR STRIP.AUTO-MCNC: NEGATIVE MG/DL
LEUKOCYTE ESTERASE UR QL STRIP.AUTO: NEGATIVE
LIPASE SERPL-CCNC: 22 U/L (ref 11–82)
LYMPHOCYTES # BLD AUTO: 1.76 K/UL (ref 1–4.8)
LYMPHOCYTES NFR BLD: 25.2 % (ref 22–41)
MCH RBC QN AUTO: 30.5 PG (ref 27–33)
MCHC RBC AUTO-ENTMCNC: 32 G/DL (ref 32.2–35.5)
MCV RBC AUTO: 95.1 FL (ref 81.4–97.8)
MICRO URNS: ABNORMAL
MONOCYTES # BLD AUTO: 0.5 K/UL (ref 0–0.85)
MONOCYTES NFR BLD AUTO: 7.2 % (ref 0–13.4)
NEUTROPHILS # BLD AUTO: 4.56 K/UL (ref 1.82–7.42)
NEUTROPHILS NFR BLD: 65.3 % (ref 44–72)
NITRITE UR QL STRIP.AUTO: NEGATIVE
NRBC # BLD AUTO: 0 K/UL
NRBC BLD-RTO: 0 /100 WBC (ref 0–0.2)
PH UR STRIP.AUTO: 5.5 [PH] (ref 5–8)
PLATELET # BLD AUTO: 331 K/UL (ref 164–446)
PMV BLD AUTO: 10 FL (ref 9–12.9)
POTASSIUM SERPL-SCNC: 3.8 MMOL/L (ref 3.6–5.5)
PROT SERPL-MCNC: 7.1 G/DL (ref 6–8.2)
PROT UR QL STRIP: NEGATIVE MG/DL
RBC # BLD AUTO: 4.07 M/UL (ref 4.2–5.4)
RBC # URNS HPF: ABNORMAL /HPF (ref 0–2)
RBC UR QL AUTO: ABNORMAL
RSV RNA SPEC QL NAA+PROBE: NEGATIVE
SARS-COV-2 RNA RESP QL NAA+PROBE: NOTDETECTED
SODIUM SERPL-SCNC: 140 MMOL/L (ref 135–145)
SP GR UR STRIP.AUTO: 1.02
SPECIMEN SOURCE: NORMAL
UROBILINOGEN UR STRIP.AUTO-MCNC: 0.2 EU/DL
WBC # BLD AUTO: 7 K/UL (ref 4.8–10.8)
WBC #/AREA URNS HPF: ABNORMAL /HPF

## 2025-06-16 PROCEDURE — 84703 CHORIONIC GONADOTROPIN ASSAY: CPT

## 2025-06-16 PROCEDURE — 99283 EMERGENCY DEPT VISIT LOW MDM: CPT

## 2025-06-16 PROCEDURE — 81001 URINALYSIS AUTO W/SCOPE: CPT

## 2025-06-16 PROCEDURE — 36415 COLL VENOUS BLD VENIPUNCTURE: CPT

## 2025-06-16 PROCEDURE — 80053 COMPREHEN METABOLIC PANEL: CPT

## 2025-06-16 PROCEDURE — 83690 ASSAY OF LIPASE: CPT

## 2025-06-16 PROCEDURE — 0241U HCHG SARS-COV-2 COVID-19 NFCT DS RESP RNA 4 TRGT MIC: CPT

## 2025-06-16 PROCEDURE — 85025 COMPLETE CBC W/AUTO DIFF WBC: CPT

## 2025-06-16 PROCEDURE — 93005 ELECTROCARDIOGRAM TRACING: CPT | Mod: TC | Performed by: EMERGENCY MEDICINE

## 2025-06-16 RX ORDER — ONDANSETRON 4 MG/1
4 TABLET, ORALLY DISINTEGRATING ORAL EVERY 6 HOURS PRN
Qty: 10 TABLET | Refills: 0 | Status: SHIPPED | OUTPATIENT
Start: 2025-06-16

## 2025-06-16 ASSESSMENT — FIBROSIS 4 INDEX: FIB4 SCORE: 0.54

## 2025-06-16 NOTE — DISCHARGE INSTRUCTIONS
You can also try an over-the-counter medication such as Zyrtec- D or Allegra- D which may help with your symptoms.  Zofran is an antinausea medication and you can take it as needed.  It is important that you drink plenty of fluids.

## 2025-06-16 NOTE — ED TRIAGE NOTES
Chief Complaint   Patient presents with    Nausea     Pt reports nausea, congestion and dizziness for about 3 days.        Pt to triage with steady gait for above complaint. Presents with nausea and dizziness for 3 days. Pt denies vomiting.     Pt back to lobby, educated on triage process and encourage to alert staff of any changes.     Vitals:    06/16/25 1155   BP: 105/68   Pulse: 70   Resp: 18   Temp: 36.6 °C (97.8 °F)   SpO2: 97%

## 2025-06-16 NOTE — ED PROVIDER NOTES
ED Provider Note    CHIEF COMPLAINT  Chief Complaint   Patient presents with    Nausea     Pt reports nausea, congestion and dizziness for about 3 days.        EXTERNAL RECORDS REVIEWED  Patient's last encounter was a GYN visit May 29 of this year patient was seen for a well woman exam and concern for exposure to STD.    HPI/ROS  LIMITATION TO HISTORY   Select: : None  OUTSIDE HISTORIAN(S):  None    Yi Baker is a 39 y.o. female who presents to the emergency department with a chief complaint of feeling nauseated, congested with dizziness.  Started on Friday.  She denies a fever.  No recent new medications.  No falls or trauma.  She was concerned that she might be dehydrated and does report that her urine output is slightly decreased, but she has no urinary symptoms.  No recent diarrhea.  She does feel congested and her other thought was that maybe she had a sinus infection.  She has had no dietary changes or restrictive dietary plans that she is adhering to.  She does not drink alcohol excessively.  She was at work today when symptoms worsen, prompting her ED visit.    PAST MEDICAL HISTORY   None reported    SURGICAL HISTORY   has a past surgical history that includes inguinal hernia repair child; abdominoplasty (2011); and lap,diagnostic abdomen (N/A, 8/21/2023).  Bilateral tubal ligation    FAMILY HISTORY  Family History   Problem Relation Age of Onset    No Known Problems Mother     No Known Problems Father        SOCIAL HISTORY  Social History     Tobacco Use    Smoking status: Never    Smokeless tobacco: Never   Vaping Use    Vaping status: Never Used   Substance and Sexual Activity    Alcohol use: Yes     Comment: occ    Drug use: No    Sexual activity: Yes     Birth control/protection: I.U.D.       CURRENT MEDICATIONS  Home Medications       Reviewed by Shanthi Baugh R.N. (Registered Nurse) on 06/16/25 at 1205  Med List Status: <None>     Medication Last Dose Status   norethindrone-ethinyl  "estradiol (LOESTRIN FE 1/20) 1-20 MG-MCG per tablet  Active   Semaglutide,0.25 or 0.5MG/DOS, 2 MG/1.5ML Solution Pen-injector  Active                  ALLERGIES  Allergies[1]    PHYSICAL EXAM  VITAL SIGNS: /77   Pulse 79   Temp 36.6 °C (97.8 °F) (Temporal)   Resp 17   Ht 1.549 m (5' 1\")   Wt 82.5 kg (181 lb 14.1 oz)   SpO2 97%   BMI 34.37 kg/m²    Vitals reviewed.  Constitutional: Patient is oriented to person, place, and time. Appears well-developed and well-nourished. No distress.    Head: Normocephalic and atraumatic.   Ears: Normal external ears bilaterally.   Mouth/Throat: Oropharynx is clear  Eyes: Conjunctivae are normal. Pupils are equal, round, and reactive to light. EOMI, no nystagmus.  Neck: Normal range of motion. Neck supple.   Cardiovascular: Normal rate, regular rhythm and normal heart sounds. Normal peripheral pulses.  Pulmonary/Chest: Effort normal and breath sounds normal. No respiratory distress, no wheezes, rhonchi, or rales.  Abdominal: Soft. Bowel sounds are normal. There is no tenderness, rebound or guarding, or peritoneal signs. No CVA tenderness.  Musculoskeletal: No edema and no tenderness.   Neurological: No cranial nerve deficits. Normal motor and sensory exam. No focal deficits. Normal Gait.   Skin: Skin is warm and dry. No erythema. No pallor.   Psychiatric: Patient has a normal mood and affect.     EKG/LABS  Results for orders placed or performed during the hospital encounter of 06/16/25   CBC WITH DIFFERENTIAL    Collection Time: 06/16/25 12:10 PM   Result Value Ref Range    WBC 7.0 4.8 - 10.8 K/uL    RBC 4.07 (L) 4.20 - 5.40 M/uL    Hemoglobin 12.4 12.0 - 16.0 g/dL    Hematocrit 38.7 37.0 - 47.0 %    MCV 95.1 81.4 - 97.8 fL    MCH 30.5 27.0 - 33.0 pg    MCHC 32.0 (L) 32.2 - 35.5 g/dL    RDW 47.7 35.9 - 50.0 fL    Platelet Count 331 164 - 446 K/uL    MPV 10.0 9.0 - 12.9 fL    Neutrophils-Polys 65.30 44.00 - 72.00 %    Lymphocytes 25.20 22.00 - 41.00 %    Monocytes 7.20 " 0.00 - 13.40 %    Eosinophils 1.60 0.00 - 6.90 %    Basophils 0.40 0.00 - 1.80 %    Immature Granulocytes 0.30 0.00 - 0.90 %    Nucleated RBC 0.00 0.00 - 0.20 /100 WBC    Neutrophils (Absolute) 4.56 1.82 - 7.42 K/uL    Lymphs (Absolute) 1.76 1.00 - 4.80 K/uL    Monos (Absolute) 0.50 0.00 - 0.85 K/uL    Eos (Absolute) 0.11 0.00 - 0.51 K/uL    Baso (Absolute) 0.03 0.00 - 0.12 K/uL    Immature Granulocytes (abs) 0.02 0.00 - 0.11 K/uL    NRBC (Absolute) 0.00 K/uL   COMP METABOLIC PANEL    Collection Time: 06/16/25 12:10 PM   Result Value Ref Range    Sodium 140 135 - 145 mmol/L    Potassium 3.8 3.6 - 5.5 mmol/L    Chloride 106 96 - 112 mmol/L    Co2 24 20 - 33 mmol/L    Anion Gap 10.0 7.0 - 16.0    Glucose 84 65 - 99 mg/dL    Bun 12 8 - 22 mg/dL    Creatinine 0.76 0.50 - 1.40 mg/dL    Calcium 9.3 8.5 - 10.5 mg/dL    Correct Calcium 9.1 8.5 - 10.5 mg/dL    AST(SGOT) 16 12 - 45 U/L    ALT(SGPT) 12 2 - 50 U/L    Alkaline Phosphatase 81 30 - 99 U/L    Total Bilirubin 0.3 0.1 - 1.5 mg/dL    Albumin 4.3 3.2 - 4.9 g/dL    Total Protein 7.1 6.0 - 8.2 g/dL    Globulin 2.8 1.9 - 3.5 g/dL    A-G Ratio 1.5 g/dL   LIPASE    Collection Time: 06/16/25 12:10 PM   Result Value Ref Range    Lipase 22 11 - 82 U/L   HCG QUAL SERUM    Collection Time: 06/16/25 12:10 PM   Result Value Ref Range    Beta-Hcg Qualitative Serum Negative Negative   ESTIMATED GFR    Collection Time: 06/16/25 12:10 PM   Result Value Ref Range    GFR (CKD-EPI) 103 >60 mL/min/1.73 m 2   URINALYSIS,CULTURE IF INDICATED    Collection Time: 06/16/25 12:20 PM    Specimen: Urine   Result Value Ref Range    Color Yellow     Character Clear     Specific Gravity 1.019 <1.035    Ph 5.5 5.0 - 8.0    Glucose Negative Negative mg/dL    Ketones Negative Negative mg/dL    Protein Negative Negative mg/dL    Bilirubin Negative Negative    Urobilinogen, Urine 0.2 <=1.0 EU/dL    Nitrite Negative Negative    Leukocyte Esterase Negative Negative    Occult Blood Trace (A) Negative     Micro Urine Req Microscopic    URINE MICROSCOPIC (W/UA)    Collection Time: 25 12:20 PM   Result Value Ref Range    WBC 0-2 /hpf    RBC 6-10 (A) 0 - 2 /hpf    Bacteria None Seen None /hpf    Epithelial Cells 0-2 0 - 5 /hpf    Urine Casts 0-2 0 - 2 /lpf   EKG    Collection Time: 25  1:16 PM   Result Value Ref Range    Report       [facility] Emergency Dept.    Test Date:  2025  Pt Name:    JOIE SEGOVIA              Department: ER  MRN:        [id number]                      Room:       [facility]  Gender:     Female                       Technician: [id number]  :        [id number]                   Requested By:AUDREY LOPEZ  Order #:    [id number]                    Reading MD: AUDREY LOPEZ DO    Measurements  Intervals                                Axis  Rate:       60                           P:          23  GA:         136                          QRS:        20  QRSD:       105                          T:          18  QT:         443  QTc:        443    Interpretive Statements  Sinus rhythm  RSR' in V1 or V2, right VCD or RVH  Compared to ECG 2025 10:47:54  Right ventricular hypertrophy now present  RSR' in V1 or V2 now present  Possible ischemia no longer present  Electronically Signed On 2025 13:16:29 PDT by AUDREY LOPEZ,      CoV-2, Flu A/B, And RSV by PCR ([facility])    Collection Time: 25  1:26 PM    Specimen: Nasal; Respirate   Result Value Ref Range    Influenza virus A RNA Negative Negative    Influenza virus B, PCR Negative Negative    RSV, PCR Negative Negative    SARS-CoV-2 by PCR NotDetected     SARS-CoV-2 Source NP Swab      I have independently interpreted this EKG    RADIOLOGY/PROCEDURES     COURSE & MEDICAL DECISION MAKING    ASSESSMENT, COURSE AND PLAN  Care Narrative:     This is a pleasant and previously healthy 39-year-old female who presents to the emergency department with a chief complaint of nausea, feeling somewhat lightheaded.  She  reports congestion.  No ear pain.  No sore throat.  No fever.  No increased work of breathing.  She has reassuring vital signs.  She has not had vomiting or diarrhea.  We discussed the possibility of dehydration although her labs are reassuring which were drawn in triage and are available at this time.  Pregnancy test negative.  She is treated with oral Zofran.  We discussed the possibility although remote, and it would not change her management of viral infection such as COVID and I will obtain a quad swab and [facility]  She will be discharged home with a course of Zofran.  We also discussed administration of a decongestant such as Zyrtec-D to help troubleshoot her symptoms as well.  I do not see indication for treatment with antibiotics at this time.  Patient is well-appearing overall and nontoxic.  Her EKG is reassuring.  At this point, I feel she can safely be discharged to home.    ADDITIONAL PROBLEMS MANAGED    DISPOSITION AND DISCUSSIONS  I have discussed management of the patient with the following physicians and SUNNI's:  None    Discussion of management with other Q or appropriate source(s):  None     Escalation of care considered, and ultimately not performed: None    Barriers to care at this time, including but not limited to: None.     Decision tools and prescription drugs considered including, but not limited to: None    FINAL DIAGNOSIS  1. Nausea    2. Sinus congestion         Electronically signed by: Inez Orourke D.O., 6/16/2025 1:16 PM           [1] No Known Allergies

## (undated) DEVICE — SLEEVE VASO CALF MED - (10PR/CA)

## (undated) DEVICE — PAD SANITARY 11IN MAXI IND WRAPPED  (12EA/PK 24PK/CA)

## (undated) DEVICE — SUTURE 4-0 MONOCRYL PLUS PS-2 - 27 INCH (36/BX)

## (undated) DEVICE — SENSOR OXIMETER ADULT SPO2 RD SET (20EA/BX)

## (undated) DEVICE — SET LEADWIRE 5 LEAD BEDSIDE DISPOSABLE ECG (1SET OF 5/EA)

## (undated) DEVICE — SUCTION INSTRUMENT YANKAUER BULBOUS TIP W/O VENT (50EA/CA)

## (undated) DEVICE — TROCAR 5X100 NON BLADED Z-TH - READ KII (6/BX)

## (undated) DEVICE — LIGASURE LAPAROSCOPIC 5MM - (6EA/CA)

## (undated) DEVICE — DERMABOND ADVANCED - (12EA/BX)

## (undated) DEVICE — TOWEL STOP TIMEOUT SAFETY FLAG (40EA/CA)

## (undated) DEVICE — TUBE E-T HI-LO CUFF 7.0MM (10EA/PK)

## (undated) DEVICE — GOWN WARMING STANDARD FLEX - (30/CA)

## (undated) DEVICE — GLOVE SZ 6 BIOGEL PI MICRO - PF LF (50PR/BX 4BX/CA)

## (undated) DEVICE — LACTATED RINGERS INJ 1000 ML - (14EA/CA 60CA/PF)

## (undated) DEVICE — SUTURE GENERAL

## (undated) DEVICE — SODIUM CHL IRRIGATION 0.9% 1000ML (12EA/CA)

## (undated) DEVICE — CANISTER SUCTION RIGID RED 1500CC (40EA/CA)

## (undated) DEVICE — MASK OXYGEN VNYL ADLT MED CONC WITH 7 FOOT TUBING  - (50EA/CA)

## (undated) DEVICE — KIT  I.V. START (100EA/CA)

## (undated) DEVICE — GLOVE BIOGEL PI INDICATOR SZ 6.0 SURGICAL PF LF -(200PR/CA)

## (undated) DEVICE — Device

## (undated) DEVICE — DRAPESURG STERI-DRAPE LONG - (10/BX 4BX/CA)

## (undated) DEVICE — CANNULA W/SEAL 5X100 Z-THRE - ADED KII (12/BX)

## (undated) DEVICE — CANNULA O2 COMFORT SOFT EAR ADULT 7 FT TUBING (50/CA)

## (undated) DEVICE — GLOVE SZ 6.5 BIOGEL PI MICRO - PF LF (50PR/BX)

## (undated) DEVICE — TUBE CONNECTING SUCTION - CLEAR PLASTIC STERILE 72 IN (50EA/CA)

## (undated) DEVICE — GLOVE BIOGEL SZ 6.5 SURGICAL PF LTX (50PR/BX 4BX/CA)

## (undated) DEVICE — CANISTER SUCTION 3000ML MECHANICAL FILTER AUTO SHUTOFF MEDI-VAC NONSTERILE LF DISP  (40EA/CA)

## (undated) DEVICE — PACK TRENGUARD 450 PROCEDURE (12EA/CA)

## (undated) DEVICE — GLOVE BIOGEL SZ 7.5 SURGICAL PF LTX - (50PR/BX 4BX/CA)

## (undated) DEVICE — TUBING CLEARLINK DUO-VENT - C-FLO (48EA/CA)

## (undated) DEVICE — WATER IRRIGATION STERILE 1000ML (12EA/CA)

## (undated) DEVICE — GOWN SURGEONS X-LARGE - DISP. (30/CA)

## (undated) DEVICE — GLOVE BIOGEL SZ 6 PF LATEX - (50EA/BX 4BX/CA)